# Patient Record
Sex: FEMALE | Race: WHITE | NOT HISPANIC OR LATINO | Employment: PART TIME | URBAN - NONMETROPOLITAN AREA
[De-identification: names, ages, dates, MRNs, and addresses within clinical notes are randomized per-mention and may not be internally consistent; named-entity substitution may affect disease eponyms.]

---

## 2019-02-13 ENCOUNTER — OFFICE VISIT (OUTPATIENT)
Dept: INTERNAL MEDICINE | Facility: CLINIC | Age: 69
End: 2019-02-13

## 2019-02-13 ENCOUNTER — RESULTS ENCOUNTER (OUTPATIENT)
Dept: INTERNAL MEDICINE | Facility: CLINIC | Age: 69
End: 2019-02-13

## 2019-02-13 VITALS
WEIGHT: 184.5 LBS | SYSTOLIC BLOOD PRESSURE: 130 MMHG | HEIGHT: 59 IN | HEART RATE: 83 BPM | OXYGEN SATURATION: 94 % | BODY MASS INDEX: 37.2 KG/M2 | DIASTOLIC BLOOD PRESSURE: 82 MMHG | RESPIRATION RATE: 16 BRPM

## 2019-02-13 DIAGNOSIS — Z00.00 ENCOUNTER FOR PREVENTIVE HEALTH EXAMINATION: ICD-10-CM

## 2019-02-13 DIAGNOSIS — Z12.12 SCREENING FOR COLORECTAL CANCER: ICD-10-CM

## 2019-02-13 DIAGNOSIS — Z12.11 SCREENING FOR COLORECTAL CANCER: ICD-10-CM

## 2019-02-13 DIAGNOSIS — F17.210 CIGARETTE SMOKER: ICD-10-CM

## 2019-02-13 DIAGNOSIS — E55.9 VITAMIN D DEFICIENCY: ICD-10-CM

## 2019-02-13 DIAGNOSIS — Z23 NEED FOR VACCINATION: ICD-10-CM

## 2019-02-13 DIAGNOSIS — I10 ESSENTIAL HYPERTENSION: Primary | ICD-10-CM

## 2019-02-13 DIAGNOSIS — E66.09 CLASS 2 OBESITY DUE TO EXCESS CALORIES WITHOUT SERIOUS COMORBIDITY WITH BODY MASS INDEX (BMI) OF 37.0 TO 37.9 IN ADULT: ICD-10-CM

## 2019-02-13 DIAGNOSIS — Z87.891 PERSONAL HISTORY OF NICOTINE DEPENDENCE: ICD-10-CM

## 2019-02-13 PROBLEM — E66.812 CLASS 2 OBESITY DUE TO EXCESS CALORIES WITHOUT SERIOUS COMORBIDITY WITH BODY MASS INDEX (BMI) OF 37.0 TO 37.9 IN ADULT: Status: ACTIVE | Noted: 2019-02-13

## 2019-02-13 PROBLEM — I34.0 MILD MITRAL REGURGITATION: Status: ACTIVE | Noted: 2019-02-13

## 2019-02-13 PROCEDURE — 90670 PCV13 VACCINE IM: CPT | Performed by: INTERNAL MEDICINE

## 2019-02-13 PROCEDURE — 90471 IMMUNIZATION ADMIN: CPT | Performed by: INTERNAL MEDICINE

## 2019-02-13 PROCEDURE — 99204 OFFICE O/P NEW MOD 45 MIN: CPT | Performed by: INTERNAL MEDICINE

## 2019-02-13 RX ORDER — IRBESARTAN AND HYDROCHLOROTHIAZIDE 300; 12.5 MG/1; MG/1
1 TABLET, FILM COATED ORAL DAILY
COMMUNITY
End: 2019-02-13

## 2019-02-13 RX ORDER — MULTIVIT-MIN/IRON/FOLIC ACID/K 18-600-40
4000 CAPSULE ORAL DAILY
COMMUNITY

## 2019-02-13 RX ORDER — AMLODIPINE BESYLATE 5 MG/1
5 TABLET ORAL DAILY
Qty: 90 TABLET | Refills: 1 | Status: SHIPPED | OUTPATIENT
Start: 2019-02-13 | End: 2019-10-29 | Stop reason: SDUPTHER

## 2019-02-13 RX ORDER — AMLODIPINE BESYLATE 5 MG/1
5 TABLET ORAL DAILY
COMMUNITY
End: 2019-02-13 | Stop reason: SDUPTHER

## 2019-02-13 RX ORDER — OLMESARTAN MEDOXOMIL AND HYDROCHLOROTHIAZIDE 40/12.5 40; 12.5 MG/1; MG/1
1 TABLET ORAL DAILY
Qty: 90 TABLET | Refills: 1 | Status: SHIPPED | OUTPATIENT
Start: 2019-02-13 | End: 2019-10-28 | Stop reason: SDUPTHER

## 2019-02-13 NOTE — PROGRESS NOTES
CC: establish care for hypertension    History:  Hillary Mann is a 68 y.o. female who presents today for evaluation of the above problems.  She notes she has been doing reasonably well, but is seeking to establish care due to difficulty in getting a replacement for her recalled irbesartan from her previous PCP. She notes her BP had done well on current medications without side effects on it, however. She does continue to smoke and is not quite ready to quit at this time. She has history of vitamin d deficiency and continues on supplements. Her weight has been stable, but she plans to work on diet and exercise when her work at the [employer] settles in the next month.     ROS:  Review of Systems   Constitutional: Negative for chills and fever.   HENT: Negative for congestion and sore throat.    Eyes: Negative for visual disturbance.   Respiratory: Negative for cough and shortness of breath.    Cardiovascular: Negative for chest pain, palpitations and leg swelling.   Gastrointestinal: Negative for abdominal pain, constipation and nausea.   Endocrine: Negative for cold intolerance and heat intolerance.   Genitourinary: Negative for difficulty urinating and frequency.   Musculoskeletal: Negative for arthralgias and back pain.   Skin: Negative for rash.   Neurological: Negative for dizziness and headaches.   Psychiatric/Behavioral: Negative for dysphoric mood. The patient is nervous/anxious.        Allergies   Allergen Reactions   • Lisinopril Cough   • Morphine And Related Itching     Past Medical History:   Diagnosis Date   • Allergic rhinitis    • Chronic sinusitis    • Deviated nasal septum    • Eustachian tube dysfunction    • Hypertension    • Hypertrophy of nasal turbinates      Past Surgical History:   Procedure Laterality Date   • BRAIN SURGERY      Aneurysm   • BREAST BIOPSY     • CHOLECYSTECTOMY       Family History   Problem Relation Age of Onset   • Diabetes Mother    • Heart disease Mother    •  "Heart disease Father    • Sinusitis Father         Sinusitis, Chronic    • Hypertension Paternal Grandmother    • Stroke Paternal Grandmother    • Diabetes Paternal Grandfather    • Stroke Paternal Grandfather       reports that she has been smoking.  She has been smoking about 1.00 pack per day. she has never used smokeless tobacco. She reports that she does not drink alcohol or use drugs.      Current Outpatient Medications:   •  amLODIPine (NORVASC) 5 MG tablet, Take 1 tablet by mouth Daily., Disp: 90 tablet, Rfl: 1  •  olmesartan-hydrochlorothiazide (BENICAR HCT) 40-12.5 MG per tablet, Take 1 tablet by mouth Daily., Disp: 90 tablet, Rfl: 1    OBJECTIVE:  /82 (BP Location: Left arm, Patient Position: Sitting, Cuff Size: Adult)   Pulse 83   Resp 16   Ht 149.9 cm (59\")   Wt 83.7 kg (184 lb 8 oz)   SpO2 94%   Breastfeeding? No   BMI 37.26 kg/m²    Physical Exam   Constitutional: She is oriented to person, place, and time. She appears well-developed and well-nourished. No distress.   HENT:   Head: Normocephalic and atraumatic.   Right Ear: External ear normal.   Left Ear: External ear normal.   Nose: Nose normal.   Mouth/Throat: Oropharynx is clear and moist. No oropharyngeal exudate.   Eyes: EOM are normal. No scleral icterus.   Neck: Normal range of motion. No tracheal deviation present.   Cardiovascular: Normal rate, regular rhythm and normal heart sounds.   No murmur heard.  Pulmonary/Chest: Effort normal and breath sounds normal. No accessory muscle usage. No respiratory distress. She has no wheezes.   Abdominal: Soft. Bowel sounds are normal. She exhibits no distension. There is no tenderness.   Musculoskeletal: Normal range of motion.   Neurological: She is alert and oriented to person, place, and time. Coordination and gait normal.   Skin: Skin is warm and dry. No cyanosis. Nails show no clubbing.   No jaundice   Psychiatric: She has a normal mood and affect. Her mood appears not anxious. She " does not exhibit a depressed mood.       Assessment/Plan    Diagnoses and all orders for this visit:    Essential hypertension  -     amLODIPine (NORVASC) 5 MG tablet; Take 1 tablet by mouth Daily.  -     olmesartan-hydrochlorothiazide (BENICAR HCT) 40-12.5 MG per tablet; Take 1 tablet by mouth Daily.  -     Comprehensive Metabolic Panel; Future  -     Lipid Panel; Future  -     CBC (No Diff); Future  Replace irbesartan-HCT with Benicar-HCT due to recalls. Continue amlodipine at current dose. BP well controlled today. Labs as ordered.     Class 2 obesity due to excess calories without serious comorbidity with body mass index (BMI) of 37.0 to 37.9 in adult  Recommended attention to portion control and being careful about the types and timing of meals for the purpose of weight management.    Vitamin D deficiency  -     Vitamin D 25 Hydroxy; Future  Check level to determine need for ongoing supplementation.     Cigarette smoker  Patient was counseled on and understood the many dangers of continuing to use tobacco. Despite this, Ms. Mann states quitting is not an immediate priority at this time. I reminded the patient that if quitting becomes an increased priority to contact us for help with quitting including pharmacologic & nonpharmacologic options or any additional resources.    Encounter for preventive health examination  -     Hepatitis C Antibody; Future    Need for vaccination  -     Pneumococcal Conjugate Vaccine 13-Valent All    Screening for colorectal cancer  -     Cologuard - Stool, Per Rectum; Future    Personal history of nicotine dependence  -     CT Chest Low Dose Wo; Future        An After Visit Summary was printed and given to the patient at discharge.  Return in about 3 months (around 5/13/2019) for Medicare Wellness.         Christopher Jo D.O. 2/13/2019

## 2019-02-26 ENCOUNTER — HOSPITAL ENCOUNTER (OUTPATIENT)
Dept: CT IMAGING | Facility: HOSPITAL | Age: 69
Discharge: HOME | End: 2019-02-26
Admitting: INTERNAL MEDICINE

## 2019-02-26 DIAGNOSIS — Z87.891 PERSONAL HISTORY OF NICOTINE DEPENDENCE: ICD-10-CM

## 2019-02-26 PROCEDURE — G0297 LDCT FOR LUNG CA SCREEN: HCPCS

## 2019-10-26 DIAGNOSIS — I10 ESSENTIAL HYPERTENSION: ICD-10-CM

## 2019-10-26 RX ORDER — OLMESARTAN MEDOXOMIL AND HYDROCHLOROTHIAZIDE 40/12.5 40; 12.5 MG/1; MG/1
1 TABLET ORAL DAILY
Qty: 90 TABLET | Refills: 0 | Status: CANCELLED | OUTPATIENT
Start: 2019-10-26

## 2019-10-28 DIAGNOSIS — I10 ESSENTIAL HYPERTENSION: ICD-10-CM

## 2019-10-28 RX ORDER — OLMESARTAN MEDOXOMIL AND HYDROCHLOROTHIAZIDE 40/12.5 40; 12.5 MG/1; MG/1
1 TABLET ORAL DAILY
Qty: 45 TABLET | Refills: 0 | Status: SHIPPED | OUTPATIENT
Start: 2019-10-28 | End: 2019-12-02 | Stop reason: SDUPTHER

## 2019-10-29 DIAGNOSIS — I10 ESSENTIAL HYPERTENSION: ICD-10-CM

## 2019-10-29 RX ORDER — OLMESARTAN MEDOXOMIL AND HYDROCHLOROTHIAZIDE 40/12.5 40; 12.5 MG/1; MG/1
TABLET ORAL
Qty: 90 TABLET | Refills: 0 | OUTPATIENT
Start: 2019-10-29

## 2019-10-29 RX ORDER — AMLODIPINE BESYLATE 5 MG/1
5 TABLET ORAL DAILY
Qty: 45 TABLET | Refills: 0 | Status: SHIPPED | OUTPATIENT
Start: 2019-10-29 | End: 2019-12-02 | Stop reason: SDUPTHER

## 2019-12-02 ENCOUNTER — OFFICE VISIT (OUTPATIENT)
Dept: INTERNAL MEDICINE | Facility: CLINIC | Age: 69
End: 2019-12-02

## 2019-12-02 ENCOUNTER — LAB (OUTPATIENT)
Dept: LAB | Facility: HOSPITAL | Age: 69
End: 2019-12-02

## 2019-12-02 VITALS
OXYGEN SATURATION: 97 % | WEIGHT: 168 LBS | BODY MASS INDEX: 33.87 KG/M2 | RESPIRATION RATE: 16 BRPM | SYSTOLIC BLOOD PRESSURE: 130 MMHG | HEIGHT: 59 IN | HEART RATE: 84 BPM | DIASTOLIC BLOOD PRESSURE: 86 MMHG

## 2019-12-02 DIAGNOSIS — Z87.891 PERSONAL HISTORY OF NICOTINE DEPENDENCE: ICD-10-CM

## 2019-12-02 DIAGNOSIS — E66.09 CLASS 1 OBESITY DUE TO EXCESS CALORIES WITH SERIOUS COMORBIDITY AND BODY MASS INDEX (BMI) OF 33.0 TO 33.9 IN ADULT: ICD-10-CM

## 2019-12-02 DIAGNOSIS — F17.210 CIGARETTE SMOKER: ICD-10-CM

## 2019-12-02 DIAGNOSIS — Z13.820 ENCOUNTER FOR OSTEOPOROSIS SCREENING IN ASYMPTOMATIC POSTMENOPAUSAL PATIENT: ICD-10-CM

## 2019-12-02 DIAGNOSIS — Z12.12 SCREENING FOR COLORECTAL CANCER: ICD-10-CM

## 2019-12-02 DIAGNOSIS — E55.9 VITAMIN D DEFICIENCY: ICD-10-CM

## 2019-12-02 DIAGNOSIS — Z23 NEED FOR VACCINATION: ICD-10-CM

## 2019-12-02 DIAGNOSIS — Z00.00 ENCOUNTER FOR PREVENTIVE HEALTH EXAMINATION: ICD-10-CM

## 2019-12-02 DIAGNOSIS — Z12.31 ENCOUNTER FOR SCREENING MAMMOGRAM FOR MALIGNANT NEOPLASM OF BREAST: ICD-10-CM

## 2019-12-02 DIAGNOSIS — I10 ESSENTIAL HYPERTENSION: ICD-10-CM

## 2019-12-02 DIAGNOSIS — I10 ESSENTIAL HYPERTENSION: Primary | ICD-10-CM

## 2019-12-02 DIAGNOSIS — Z78.0 ENCOUNTER FOR OSTEOPOROSIS SCREENING IN ASYMPTOMATIC POSTMENOPAUSAL PATIENT: ICD-10-CM

## 2019-12-02 DIAGNOSIS — Z12.11 SCREENING FOR COLORECTAL CANCER: ICD-10-CM

## 2019-12-02 PROBLEM — E66.811 CLASS 1 OBESITY DUE TO EXCESS CALORIES WITHOUT SERIOUS COMORBIDITY WITH BODY MASS INDEX (BMI) OF 33.0 TO 33.9 IN ADULT: Status: ACTIVE | Noted: 2019-02-13

## 2019-12-02 LAB
25(OH)D3 SERPL-MCNC: 26.8 NG/ML (ref 30–100)
ALBUMIN SERPL-MCNC: 4.5 G/DL (ref 3.5–5.2)
ALBUMIN/GLOB SERPL: 1.2 G/DL
ALP SERPL-CCNC: 92 U/L (ref 39–117)
ALT SERPL W P-5'-P-CCNC: 13 U/L (ref 1–33)
ANION GAP SERPL CALCULATED.3IONS-SCNC: 14.4 MMOL/L (ref 5–15)
AST SERPL-CCNC: 15 U/L (ref 1–32)
BILIRUB SERPL-MCNC: 0.3 MG/DL (ref 0.2–1.2)
BUN BLD-MCNC: 11 MG/DL (ref 8–23)
BUN/CREAT SERPL: 13.8 (ref 7–25)
CALCIUM SPEC-SCNC: 9.5 MG/DL (ref 8.6–10.5)
CHLORIDE SERPL-SCNC: 97 MMOL/L (ref 98–107)
CHOLEST SERPL-MCNC: 207 MG/DL (ref 0–200)
CO2 SERPL-SCNC: 26.6 MMOL/L (ref 22–29)
CREAT BLD-MCNC: 0.8 MG/DL (ref 0.57–1)
DEPRECATED RDW RBC AUTO: 40.6 FL (ref 37–54)
ERYTHROCYTE [DISTWIDTH] IN BLOOD BY AUTOMATED COUNT: 13 % (ref 12.3–15.4)
GFR SERPL CREATININE-BSD FRML MDRD: 72 ML/MIN/1.73
GLOBULIN UR ELPH-MCNC: 3.8 GM/DL
GLUCOSE BLD-MCNC: 98 MG/DL (ref 65–99)
HCT VFR BLD AUTO: 41.6 % (ref 34–46.6)
HCV AB SER DONR QL: NORMAL
HDLC SERPL-MCNC: 44 MG/DL (ref 40–60)
HGB BLD-MCNC: 14.5 G/DL (ref 12–15.9)
LDLC SERPL CALC-MCNC: 118 MG/DL (ref 0–100)
LDLC/HDLC SERPL: 2.69 {RATIO}
MCH RBC QN AUTO: 30 PG (ref 26.6–33)
MCHC RBC AUTO-ENTMCNC: 34.9 G/DL (ref 31.5–35.7)
MCV RBC AUTO: 86 FL (ref 79–97)
PLATELET # BLD AUTO: 423 10*3/MM3 (ref 140–450)
PMV BLD AUTO: 10 FL (ref 6–12)
POTASSIUM BLD-SCNC: 3.4 MMOL/L (ref 3.5–5.2)
PROT SERPL-MCNC: 8.3 G/DL (ref 6–8.5)
RBC # BLD AUTO: 4.84 10*6/MM3 (ref 3.77–5.28)
SODIUM BLD-SCNC: 138 MMOL/L (ref 136–145)
TRIGL SERPL-MCNC: 224 MG/DL (ref 0–150)
VLDLC SERPL-MCNC: 44.8 MG/DL (ref 5–40)
WBC NRBC COR # BLD: 9.77 10*3/MM3 (ref 3.4–10.8)

## 2019-12-02 PROCEDURE — 36415 COLL VENOUS BLD VENIPUNCTURE: CPT

## 2019-12-02 PROCEDURE — 80053 COMPREHEN METABOLIC PANEL: CPT | Performed by: INTERNAL MEDICINE

## 2019-12-02 PROCEDURE — 86803 HEPATITIS C AB TEST: CPT | Performed by: INTERNAL MEDICINE

## 2019-12-02 PROCEDURE — 85027 COMPLETE CBC AUTOMATED: CPT | Performed by: INTERNAL MEDICINE

## 2019-12-02 PROCEDURE — 80061 LIPID PANEL: CPT | Performed by: INTERNAL MEDICINE

## 2019-12-02 PROCEDURE — 99214 OFFICE O/P EST MOD 30 MIN: CPT | Performed by: INTERNAL MEDICINE

## 2019-12-02 PROCEDURE — G0438 PPPS, INITIAL VISIT: HCPCS | Performed by: INTERNAL MEDICINE

## 2019-12-02 PROCEDURE — 82306 VITAMIN D 25 HYDROXY: CPT | Performed by: INTERNAL MEDICINE

## 2019-12-02 RX ORDER — AMLODIPINE BESYLATE 5 MG/1
5 TABLET ORAL DAILY
Qty: 90 TABLET | Refills: 1 | Status: SHIPPED | OUTPATIENT
Start: 2019-12-02 | End: 2020-06-02 | Stop reason: SDUPTHER

## 2019-12-02 RX ORDER — OLMESARTAN MEDOXOMIL AND HYDROCHLOROTHIAZIDE 40/12.5 40; 12.5 MG/1; MG/1
1 TABLET ORAL DAILY
Qty: 90 TABLET | Refills: 1 | Status: SHIPPED | OUTPATIENT
Start: 2019-12-02 | End: 2020-06-02 | Stop reason: SDUPTHER

## 2019-12-02 NOTE — PROGRESS NOTES
The ABCs of the Annual Wellness Visit  Initial Medicare Wellness Visit    No chief complaint on file.  See  note    Subjective   History of Present Illness:  Hillary Mann is a 69 y.o. female who presents for an Initial Medicare Wellness Visit.    HEALTH RISK ASSESSMENT    Recent Hospitalizations:  No hospitalization(s) within the last year.    Current Medical Providers:  Patient Care Team:  Christopher Jo DO as PCP - General (Internal Medicine)  Donell Del Rosario MD as PCP - Claims Attributed    Smoking Status:  Social History     Tobacco Use   Smoking Status Current Every Day Smoker   • Packs/day: 0.50   Smokeless Tobacco Never Used   Tobacco Comment    90 pack year       Alcohol Consumption:  Social History     Substance and Sexual Activity   Alcohol Use No       Depression Screen:   PHQ-2/PHQ-9 Depression Screening 12/2/2019   Little interest or pleasure in doing things 0   Feeling down, depressed, or hopeless 0   Total Score 0       Fall Risk Screen:  ANGEL Fall Risk Assessment was completed, and patient is at LOW risk for falls.Assessment completed on:12/2/2019    Health Habits and Functional and Cognitive Screening:  Functional & Cognitive Status 12/2/2019   Do you have difficulty preparing food and eating? No   Do you have difficulty bathing yourself, getting dressed or grooming yourself? No   Do you have difficulty using the toilet? No   Do you have difficulty moving around from place to place? No   Do you have trouble with steps or getting out of a bed or a chair? No   Current Diet Well Balanced Diet   Dental Exam Not up to date   Eye Exam Not up to date   Exercise (times per week) 0 times per week   Current Exercise Activities Include None   Do you need help using the phone?  No   Are you deaf or do you have serious difficulty hearing?  No   Do you need help with transportation? No   Do you need help shopping? No   Do you need help preparing meals?  No   Do you need help with  housework?  No   Do you need help with laundry? No   Do you need help taking your medications? No   Do you need help managing money? No   Do you ever drive or ride in a car without wearing a seat belt? No   Have you felt unusual stress, anger or loneliness in the last month? No   Who do you live with? Spouse   If you need help, do you have trouble finding someone available to you? No   Have you been bothered in the last four weeks by sexual problems? No   Do you have difficulty concentrating, remembering or making decisions? No         Does the patient have evidence of cognitive impairment? No    Asprin use counseling:Does not need ASA (and currently is not on it)    Age-appropriate Screening Schedule:  Refer to the list below for future screening recommendations based on patient's age, sex and/or medical conditions. Orders for these recommended tests are listed in the plan section. The patient has been provided with a written plan.    Health Maintenance   Topic Date Due   • TDAP/TD VACCINES (1 - Tdap) 08/22/1971   • ZOSTER VACCINE (1 of 2) 08/22/2002   • MAMMOGRAM  04/22/2018   • COLONOSCOPY  02/13/2019   • PNEUMOCOCCAL VACCINES (65+ LOW/MEDIUM RISK) (2 of 2 - PPSV23) 02/13/2020   • INFLUENZA VACCINE  Addressed          The following portions of the patient's history were reviewed and updated as appropriate: allergies, current medications, past family history, past medical history, past social history, past surgical history and problem list.    Outpatient Medications Prior to Visit   Medication Sig Dispense Refill   • Cholecalciferol (VITAMIN D) 2000 units capsule Take 12,000 Units by mouth Daily.     • amLODIPine (NORVASC) 5 MG tablet Take 1 tablet by mouth Daily. 45 tablet 0   • olmesartan-hydrochlorothiazide (BENICAR HCT) 40-12.5 MG per tablet Take 1 tablet by mouth Daily. 45 tablet 0     No facility-administered medications prior to visit.        Patient Active Problem List   Diagnosis   • Essential  "hypertension   • Class 1 obesity due to excess calories with serious comorbidity and body mass index (BMI) of 33.0 to 33.9 in adult   • Vitamin D deficiency   • Cigarette smoker   • Mild mitral regurgitation       Advanced Care Planning:  Patient does not have an advance directive - information provided to the patient today    Review of Systems See  note    Compared to one year ago, the patient feels her physical health is better.  Compared to one year ago, the patient feels her mental health is the same.    Reviewed chart for potential of high risk medication in the elderly: yes  Reviewed chart for potential of harmful drug interactions in the elderly:yes    Objective         Vitals:    12/02/19 1001   BP: 130/86   BP Location: Right arm   Patient Position: Sitting   Cuff Size: Adult   Pulse: 84   Resp: 16   SpO2: 97%   Weight: 76.2 kg (168 lb)   Height: 149.9 cm (59\")       Body mass index is 33.93 kg/m².  Discussed the patient's BMI with her. The BMI is above average; BMI management plan is completed.    Physical Exam See  note    Lab Results   Component Value Date    TRIG 224 (H) 12/02/2019    HDL 44 12/02/2019     (H) 12/02/2019    VLDL 44.8 (H) 12/02/2019        Assessment/Plan   Medicare Risks and Personalized Health Plan  CMS Preventative Services Quick Reference  Advance Directive Discussion  Breast Cancer/Mammogram Screening  Cardiovascular risk  Depression/Dysphoria  Fall Risk  Immunizations Discussed/Encouraged (specific immunizations; adacel Tdap, Influenza and Shingrix )  Inactivity/Sedentary  Lung Cancer Risk  Obesity/Overweight   Osteoprorosis Risk    The above risks/problems have been discussed with the patient.  Pertinent information has been shared with the patient in the After Visit Summary.  Follow up plans and orders are seen below in the Assessment/Plan Section.    Diagnoses and all orders for this visit:    1. Essential hypertension (Primary)  -     amLODIPine " (NORVASC) 5 MG tablet; Take 1 tablet by mouth Daily.  Dispense: 90 tablet; Refill: 1  -     olmesartan-hydrochlorothiazide (BENICAR HCT) 40-12.5 MG per tablet; Take 1 tablet by mouth Daily.  Dispense: 90 tablet; Refill: 1    2. Class 1 obesity due to excess calories with serious comorbidity and body mass index (BMI) of 33.0 to 33.9 in adult    3. Cigarette smoker    4. Personal history of nicotine dependence  -     CT Chest Without Contrast; Future    5. Encounter for osteoporosis screening in asymptomatic postmenopausal patient  -     DEXA Bone Density Axial; Future    6. Screening for colorectal cancer  -     Ambulatory Referral For Screening Colonoscopy    7. Encounter for screening mammogram for malignant neoplasm of breast  -     Mammo Screening Digital Tomosynthesis Bilateral With CAD; Future    8. Need for vaccination  -     Fluad Tri 65yr ([id number])      Follow Up:  Return in about 6 months (around 6/2/2020) for Recheck.     An After Visit Summary and PPPS were given to the patient.

## 2019-12-02 NOTE — PATIENT INSTRUCTIONS
Medicare Wellness  Personal Prevention Plan of Service     Date of Office Visit:  2019  Encounter Provider:  Christopher Jo DO  Place of Service:  [facility]  Patient Name: Hillary Mann  :  1950    As part of the Medicare Wellness portion of your visit today, we are providing you with this personalized preventive plan of services (PPPS). This plan is based upon recommendations of the [place] Preventive Services Task Force (USPSTF) and the Advisory Committee on Immunization Practices (ACIP).    This lists the preventive care services that should be considered, and provides dates of when you are due. Items listed as completed are up-to-date and do not require any further intervention.    Health Maintenance   Topic Date Due   • TDAP/TD VACCINES (1 - Tdap) 1971   • ZOSTER VACCINE (1 of 2) 2002   • MAMMOGRAM  2018   • HEPATITIS C SCREENING  2019   • MEDICARE ANNUAL WELLNESS  2019   • COLONOSCOPY  2019   • INFLUENZA VACCINE  2019   • PNEUMOCOCCAL VACCINES (65+ LOW/MEDIUM RISK) (2 of 2 - PPSV23) 2020   • LUNG CANCER SCREENING  2020       Orders Placed This Encounter   Procedures   • CT Chest Without Contrast     Standing Status:   Future     Standing Expiration Date:   2020   • Mammo Screening Digital Tomosynthesis Bilateral With CAD     Standing Status:   Future     Standing Expiration Date:   2020     Order Specific Question:   Reason for Exam:     Answer:   screening   • DEXA Bone Density Axial     Standing Status:   Future     Standing Expiration Date:   2020     Order Specific Question:   Reason for Exam:     Answer:   screening   • Fluad Tri 65yr ([id number])   • Ambulatory Referral For Screening Colonoscopy     Referral Priority:   Routine     Referral Type:   Diagnostic Medical     Referral Reason:   Specialty Services Required     Referred to Provider:   Dread  Thiago TEMPLETON, DO     Number of Visits Requested:   1       Return in about 6 months (around 6/2/2020) for Recheck.

## 2019-12-24 ENCOUNTER — HOSPITAL ENCOUNTER (OUTPATIENT)
Dept: BONE DENSITY | Facility: HOSPITAL | Age: 69
Discharge: HOME | End: 2019-12-24

## 2019-12-24 ENCOUNTER — HOSPITAL ENCOUNTER (OUTPATIENT)
Dept: MAMMOGRAPHY | Facility: HOSPITAL | Age: 69
Discharge: HOME | End: 2019-12-24
Admitting: INTERNAL MEDICINE

## 2019-12-24 DIAGNOSIS — Z12.31 ENCOUNTER FOR SCREENING MAMMOGRAM FOR MALIGNANT NEOPLASM OF BREAST: ICD-10-CM

## 2019-12-24 DIAGNOSIS — Z13.820 ENCOUNTER FOR OSTEOPOROSIS SCREENING IN ASYMPTOMATIC POSTMENOPAUSAL PATIENT: ICD-10-CM

## 2019-12-24 DIAGNOSIS — Z78.0 ENCOUNTER FOR OSTEOPOROSIS SCREENING IN ASYMPTOMATIC POSTMENOPAUSAL PATIENT: ICD-10-CM

## 2019-12-24 PROCEDURE — 77080 DXA BONE DENSITY AXIAL: CPT

## 2019-12-24 PROCEDURE — 77067 SCR MAMMO BI INCL CAD: CPT

## 2019-12-24 PROCEDURE — 77063 BREAST TOMOSYNTHESIS BI: CPT

## 2019-12-26 PROBLEM — M85.852 OSTEOPENIA OF LEFT HIP: Status: ACTIVE | Noted: 2019-12-26

## 2019-12-26 RX ORDER — PHENOL 1.4 %
600 AEROSOL, SPRAY (ML) MUCOUS MEMBRANE DAILY
COMMUNITY
End: 2020-12-02

## 2020-02-28 ENCOUNTER — APPOINTMENT (OUTPATIENT)
Dept: CT IMAGING | Facility: HOSPITAL | Age: 70
End: 2020-02-28

## 2020-03-11 ENCOUNTER — HOSPITAL ENCOUNTER (OUTPATIENT)
Dept: CT IMAGING | Facility: HOSPITAL | Age: 70
Discharge: HOME | End: 2020-03-11
Admitting: INTERNAL MEDICINE

## 2020-03-11 DIAGNOSIS — Z87.891 PERSONAL HISTORY OF NICOTINE DEPENDENCE: ICD-10-CM

## 2020-03-11 PROCEDURE — G0297 LDCT FOR LUNG CA SCREEN: HCPCS

## 2020-06-02 ENCOUNTER — OFFICE VISIT (OUTPATIENT)
Dept: INTERNAL MEDICINE | Facility: CLINIC | Age: 70
End: 2020-06-02

## 2020-06-02 VITALS
RESPIRATION RATE: 16 BRPM | WEIGHT: 175.5 LBS | SYSTOLIC BLOOD PRESSURE: 118 MMHG | BODY MASS INDEX: 35.38 KG/M2 | HEIGHT: 59 IN | OXYGEN SATURATION: 95 % | DIASTOLIC BLOOD PRESSURE: 84 MMHG | HEART RATE: 71 BPM | TEMPERATURE: 97.6 F

## 2020-06-02 DIAGNOSIS — I10 ESSENTIAL HYPERTENSION: ICD-10-CM

## 2020-06-02 DIAGNOSIS — E66.01 CLASS 2 SEVERE OBESITY DUE TO EXCESS CALORIES WITH SERIOUS COMORBIDITY AND BODY MASS INDEX (BMI) OF 35.0 TO 35.9 IN ADULT (HCC): ICD-10-CM

## 2020-06-02 DIAGNOSIS — F17.210 CIGARETTE SMOKER: ICD-10-CM

## 2020-06-02 DIAGNOSIS — Z12.12 SCREENING FOR COLORECTAL CANCER: ICD-10-CM

## 2020-06-02 DIAGNOSIS — Z12.11 SCREENING FOR COLORECTAL CANCER: ICD-10-CM

## 2020-06-02 DIAGNOSIS — Z86.73 HISTORY OF TIA (TRANSIENT ISCHEMIC ATTACK): Primary | ICD-10-CM

## 2020-06-02 DIAGNOSIS — N39.41 URGE INCONTINENCE OF URINE: ICD-10-CM

## 2020-06-02 DIAGNOSIS — Z23 NEED FOR VACCINATION: ICD-10-CM

## 2020-06-02 DIAGNOSIS — E55.9 VITAMIN D DEFICIENCY: ICD-10-CM

## 2020-06-02 PROCEDURE — G0009 ADMIN PNEUMOCOCCAL VACCINE: HCPCS | Performed by: INTERNAL MEDICINE

## 2020-06-02 PROCEDURE — 99214 OFFICE O/P EST MOD 30 MIN: CPT | Performed by: INTERNAL MEDICINE

## 2020-06-02 PROCEDURE — 90732 PPSV23 VACC 2 YRS+ SUBQ/IM: CPT | Performed by: INTERNAL MEDICINE

## 2020-06-02 RX ORDER — OLMESARTAN MEDOXOMIL AND HYDROCHLOROTHIAZIDE 40/12.5 40; 12.5 MG/1; MG/1
1 TABLET ORAL DAILY
Qty: 90 TABLET | Refills: 3 | Status: SHIPPED | OUTPATIENT
Start: 2020-06-02 | End: 2020-12-02

## 2020-06-02 RX ORDER — ASPIRIN 81 MG/1
81 TABLET, CHEWABLE ORAL DAILY
COMMUNITY

## 2020-06-02 RX ORDER — AMLODIPINE BESYLATE 5 MG/1
5 TABLET ORAL DAILY
Qty: 90 TABLET | Refills: 3 | Status: SHIPPED | OUTPATIENT
Start: 2020-06-02 | End: 2020-12-02

## 2020-06-02 RX ORDER — ATORVASTATIN CALCIUM 20 MG/1
20 TABLET, FILM COATED ORAL DAILY
Qty: 90 TABLET | Refills: 3 | Status: SHIPPED | OUTPATIENT
Start: 2020-06-02 | End: 2020-12-02

## 2020-06-02 NOTE — PATIENT INSTRUCTIONS
Kegel Exercises    Kegel exercises can help strengthen your pelvic floor muscles. The pelvic floor is a group of muscles that support your rectum, small intestine, and bladder. In females, pelvic floor muscles also help support the womb (uterus). These muscles help you control the flow of urine and stool.  Kegel exercises are painless and simple, and they do not require any equipment. Your provider may suggest Kegel exercises to:  · Improve bladder and bowel control.  · Improve sexual response.  · Improve weak pelvic floor muscles after surgery to remove the uterus (hysterectomy) or pregnancy (females).  · Improve weak pelvic floor muscles after prostate gland removal or surgery (males).  Kegel exercises involve squeezing your pelvic floor muscles, which are the same muscles you squeeze when you try to stop the flow of urine or keep from passing gas. The exercises can be done while sitting, standing, or lying down, but it is best to vary your position.  Exercises  How to do Kegel exercises:  1. Squeeze your pelvic floor muscles tight. You should feel a tight lift in your rectal area. If you are a female, you should also feel a tightness in your vaginal area. Keep your stomach, buttocks, and legs relaxed.  2. Hold the muscles tight for up to 10 seconds.  3. Breathe normally.  4. Relax your muscles.  5. Repeat as told by your health care provider.  Repeat this exercise daily as told by your health care provider. Continue to do this exercise for at least 4-6 weeks, or for as long as told by your health care provider.  You may be referred to a physical therapist who can help you learn more about how to do Kegel exercises.  Depending on your condition, your health care provider may recommend:  · Varying how long you squeeze your muscles.  · Doing several sets of exercises every day.  · Doing exercises for several weeks.  · Making Kegel exercises a part of your regular exercise routine.  This information is not intended  to replace advice given to you by your health care provider. Make sure you discuss any questions you have with your health care provider.  Document Released: 12/04/2013 Document Revised: 08/07/2019 Document Reviewed: 08/07/2019  Elsevier Patient Education © 2020 [employer]

## 2020-06-02 NOTE — PROGRESS NOTES
"CC: f/u hypertension    History:  Hillary Mann is a 69 y.o. female   She reports she has been doing reasonably well, but did have an episode of dysarthria about 2 months ago, though she went to the hospital in [place] and had work-up that was largely negative.  She was started on a baby aspirin and has had no further episodes.    Blood pressure has done well on current medication without side effects.    She does continue to have difficulty with urge incontinence, though she has thought about scheduling urination.  She does not feel it is bad enough to take medication for at this time.       ROS:  Review of Systems   Constitutional: Negative for chills and fever.   Respiratory: Negative for cough and shortness of breath.    Cardiovascular: Negative for chest pain and palpitations.   Gastrointestinal: Negative for abdominal pain and constipation.   Genitourinary: Positive for urgency. Negative for difficulty urinating and dysuria.        reports that she has been smoking. She has been smoking about 0.50 packs per day. She has never used smokeless tobacco. She reports that she does not drink alcohol or use drugs.      Current Outpatient Medications:   •  amLODIPine (NORVASC) 5 MG tablet, Take 1 tablet by mouth Daily., Disp: 90 tablet, Rfl: 1  •  calcium carbonate (OS-DOUG) 600 MG tablet, Take 600 mg by mouth Daily., Disp: , Rfl:   •  Cholecalciferol (VITAMIN D) 2000 units capsule, Take 12,000 Units by mouth Daily., Disp: , Rfl:   •  olmesartan-hydrochlorothiazide (BENICAR HCT) 40-12.5 MG per tablet, Take 1 tablet by mouth Daily., Disp: 90 tablet, Rfl: 1    OBJECTIVE:  /84 (BP Location: Left arm, Patient Position: Sitting, Cuff Size: Adult)   Pulse 71   Temp 97.6 °F (36.4 °C)   Resp 16   Ht 149.9 cm (59\")   Wt 79.6 kg (175 lb 8 oz)   SpO2 95%   Breastfeeding No   BMI 35.45 kg/m²    Physical Exam   Constitutional: She is oriented to person, place, and time. She appears well-nourished. No distress. "   Cardiovascular: Normal rate and normal heart sounds. A regularly irregular rhythm present.   No murmur heard.  Pulmonary/Chest: Effort normal and breath sounds normal. No respiratory distress. She has no wheezes.   Neurological: She is alert and oriented to person, place, and time.   Psychiatric: She has a normal mood and affect.       Assessment/Plan     Diagnoses and all orders for this visit:    History of TIA (transient ischemic attack)  -     atorvastatin (LIPITOR) 20 MG tablet; Take 1 tablet by mouth Daily.  -     Lipid Panel; Future  Continue ASA & add statin for secondary prevention.     Essential hypertension  -     olmesartan-hydrochlorothiazide (BENICAR HCT) 40-12.5 MG per tablet; Take 1 tablet by mouth Daily.  -     amLODIPine (NORVASC) 5 MG tablet; Take 1 tablet by mouth Daily.  -     Comprehensive Metabolic Panel; Future  -     Lipid Panel; Future  Well controlled, BP goal for age is <140/90 per JNC 8 guidelines and continue current medications    Cigarette smoker  Patient was counseled on and understood the many dangers of continuing to use tobacco. Despite this, Ms. Mann states quitting is not an immediate priority at this time. I reminded the patient that if quitting becomes an increased priority to contact us for help with quitting including pharmacologic & nonpharmacologic options or any additional resources.    Class 2 severe obesity due to excess calories with serious comorbidity and body mass index (BMI) of 35.0 to 35.9 in adult (CMS/HCC)  Recommended attention to portion control and being careful about the types and timing of meals for the purpose of weight management.    Vitamin D deficiency  -     Vitamin D 25 Hydroxy; Future    Urge incontinence of urine  Discussed Kegels, timed voiding, and medication. She prefers to try conservative measures at this time. Marilynn with AVS.     Screening for colorectal cancer  -     Ambulatory Referral For Screening Colonoscopy    Need for  vaccination  -     Pneumococcal Polysaccharide Vaccine 23-Valent Greater Than or Equal To 3yo Subcutaneous / IM        An After Visit Summary was printed and given to the patient at discharge.  Return in about 6 months (around 12/2/2020) for Medicare Wellness.          Christopher Jo D.O. 6/2/2020   Electronically signed.

## 2020-06-17 ENCOUNTER — TELEPHONE (OUTPATIENT)
Dept: INTERNAL MEDICINE | Facility: CLINIC | Age: 70
End: 2020-06-17

## 2020-06-17 NOTE — PROGRESS NOTES
Chief Complaint   Patient presents with   • Colon Cancer Screening     last colon 07/2008       PCP: Christopher Jo DO  REFER: Christopher Jo,     Subjective     HPI    Hillary Mann is a 69 y.o. female who presents to office for preventative maintenance.  There is not  a personal history of colon polyps.  There is not a history of colon cancer.  She does not have complaints of nausea/vomiting, change in bowels, weight loss, no BRBPR, no melena.  There is not a family history of colon cancer.  There is not a family history of colon polyps.  She last colonoscopy-2008 .  Bowels do move on regular basis.    CScope (Dr Canada) 2008-normal   Endoscopy (Dr Canada) 2007-normal         Past Medical History:   Diagnosis Date   • Allergic rhinitis    • Chronic sinusitis    • Deviated nasal septum    • Eustachian tube dysfunction    • Hypertension    • Hypertrophy of nasal turbinates      Past Surgical History:   Procedure Laterality Date   • BRAIN SURGERY      Aneurysm   • BREAST EXCISIONAL BIOPSY Left 1975    benign   • BREAST EXCISIONAL BIOPSY Left 1988    benign   • CHOLECYSTECTOMY     • COLONOSCOPY  07/22/2008    normal   • ENDOSCOPY  11/02/2007    normal     Outpatient Medications Marked as Taking for the 6/18/20 encounter (Office Visit) with Marvel Andre APRN   Medication Sig Dispense Refill   • amLODIPine (NORVASC) 5 MG tablet Take 1 tablet by mouth Daily. 90 tablet 3   • aspirin 81 MG chewable tablet Chew 81 mg Daily.     • calcium carbonate (OS-DOUG) 600 MG tablet Take 600 mg by mouth Daily.     • Cholecalciferol (VITAMIN D) 2000 units capsule Take 12,000 Units by mouth Daily.     • olmesartan-hydrochlorothiazide (BENICAR HCT) 40-12.5 MG per tablet Take 1 tablet by mouth Daily. 90 tablet 3     Allergies   Allergen Reactions   • Lisinopril Cough   • Morphine And Related Itching     Social History     Socioeconomic History   • Marital status:      Spouse name: Not on file   • Number  of children: Not on file   • Years of education: Not on file   • Highest education level: Not on file   Tobacco Use   • Smoking status: Current Every Day Smoker     Packs/day: 0.50   • Smokeless tobacco: Never Used   • Tobacco comment: 90 pack year   Substance and Sexual Activity   • Alcohol use: No   • Drug use: No   • Sexual activity: Yes     Partners: Male     Family History   Problem Relation Age of Onset   • Diabetes Mother    • Heart disease Mother    • Heart disease Father    • Sinusitis Father         Sinusitis, Chronic    • Hypertension Paternal Grandmother    • Stroke Paternal Grandmother    • Diabetes Paternal Grandfather    • Stroke Paternal Grandfather    • Breast cancer Neg Hx    • Colon cancer Neg Hx      Review of Systems   Constitutional: Negative for fatigue, fever and unexpected weight change.   HENT: Negative for hearing loss, sore throat and voice change.    Eyes: Negative for visual disturbance.   Respiratory: Negative for cough, shortness of breath and wheezing.    Cardiovascular: Negative for chest pain and palpitations.   Gastrointestinal: Negative for abdominal pain, blood in stool and vomiting.   Endocrine: Negative for polydipsia and polyuria.   Genitourinary: Negative for difficulty urinating, dysuria, hematuria and urgency.   Musculoskeletal: Negative for joint swelling and myalgias.   Skin: Negative for color change, rash and wound.   Neurological: Negative for dizziness, tremors, seizures and syncope.   Hematological: Does not bruise/bleed easily.   Psychiatric/Behavioral: Negative for agitation and confusion. The patient is not nervous/anxious.      Objective   Vitals:    06/18/20 1042   BP: 132/78   Pulse: 79   Temp: 97 °F (36.1 °C)   SpO2: 98%     Physical Exam   Constitutional: She is oriented to person, place, and time. She appears well-developed and well-nourished. She is cooperative.   HENT:   Head: Normocephalic and atraumatic.   Eyes: Pupils are equal, round, and reactive to  light. Conjunctivae are normal. No scleral icterus.   Neck: Normal range of motion. Neck supple. No JVD present. No thyroid mass and no thyromegaly present.   Cardiovascular: Normal rate, regular rhythm and normal heart sounds. Exam reveals no gallop and no friction rub.   No murmur heard.  Pulmonary/Chest: Effort normal and breath sounds normal. No accessory muscle usage. No respiratory distress. She has no wheezes. She has no rales.   Abdominal: Soft. Normal appearance and bowel sounds are normal. She exhibits no distension, no ascites and no mass. There is no hepatosplenomegaly. There is no tenderness. There is no rebound and no guarding.   Musculoskeletal: Normal range of motion. She exhibits no edema or tenderness.     Vascular Status -  Her right foot exhibits normal foot vasculature  and no edema. Her left foot exhibits normal foot vasculature  and no edema.  Lymphadenopathy:     She has no cervical adenopathy.   Neurological: She is alert and oriented to person, place, and time. She has normal strength. Gait normal.   Skin: Skin is warm, dry and intact. No rash noted.     Imaging Results (Most Recent)     None        Body mass index is 35.95 kg/m².    Assessment/Plan   Hillary was seen today for colon cancer screening.    Diagnoses and all orders for this visit:    Encounter for screening for malignant neoplasm of colon  -     Case Request; Standing  -     Case Request    Other orders  -     sodium-potassium-magnesium sulfates (Suprep Bowel Prep Kit) 17.5-3.13-1.6 GM/177ML solution oral solution; Take as directed      COLONOSCOPY WITH ANESTHESIA (N/A)      Patient is to continue all blood pressure and cardiac medications prior to procedure and has been advised to take medications morning of procedure   Pt verbalized understanding     Advised pt to stop ASA, use of NSAIDs, Fish Oil, and MV 5 days prior to procedure, per Dr Canada protocol.  Tylenol based products are ok to take.  Pt verbalized  understanding.    All risks, benefits, alternatives, and indications of colonoscopy procedure have been discussed with the patient. Risks to include perforation of the colon requiring possible surgery or colostomy, risk of bleeding from biopsies or removal of colon tissue, possibility of missing a colon polyp or cancer, or adverse drug reaction.  Benefits to include the diagnosis and management of disease of the colon and rectum. Alternatives to include barium enema, radiographic evaluation, lab testing or no intervention. She verbalizes understanding and agrees.     Patient's Body mass index is 35.95 kg/m². BMI is above normal parameters. Recommendations include: no follow up.      Precautions are currently being put in place due to COVID-19.  I have explained to Hillary Mann they will be required to undergo COVID testing prior to their procedure.  Hillary Mann verbalized understanding and was willing to proceed.     Marvel Andre, APRN  06/18/20        There are no Patient Instructions on file for this visit.

## 2020-06-17 NOTE — TELEPHONE ENCOUNTER
Patient was informed.       Patient called she states that she is having leg cramps, she did start the Lipitor they are getting worse. Was wondering what to do?     Per Dr. Jo, he states that she can stop the medication & see how the cramps are, if they continue to call the office.

## 2020-06-18 ENCOUNTER — OFFICE VISIT (OUTPATIENT)
Dept: GASTROENTEROLOGY | Facility: CLINIC | Age: 70
End: 2020-06-18

## 2020-06-18 ENCOUNTER — TRANSCRIBE ORDERS (OUTPATIENT)
Dept: ADMINISTRATIVE | Facility: HOSPITAL | Age: 70
End: 2020-06-18

## 2020-06-18 VITALS
SYSTOLIC BLOOD PRESSURE: 132 MMHG | WEIGHT: 178 LBS | BODY MASS INDEX: 35.88 KG/M2 | TEMPERATURE: 97 F | DIASTOLIC BLOOD PRESSURE: 78 MMHG | HEIGHT: 59 IN | OXYGEN SATURATION: 98 % | HEART RATE: 79 BPM

## 2020-06-18 DIAGNOSIS — Z01.818 PRE-OP TESTING: Primary | ICD-10-CM

## 2020-06-18 DIAGNOSIS — Z12.11 ENCOUNTER FOR SCREENING FOR MALIGNANT NEOPLASM OF COLON: Primary | ICD-10-CM

## 2020-06-18 PROCEDURE — S0260 H&P FOR SURGERY: HCPCS | Performed by: NURSE PRACTITIONER

## 2020-06-18 RX ORDER — SODIUM, POTASSIUM,MAG SULFATES 17.5-3.13G
SOLUTION, RECONSTITUTED, ORAL ORAL
Qty: 1 BOTTLE | Refills: 0 | Status: SHIPPED | OUTPATIENT
Start: 2020-06-18 | End: 2020-12-02

## 2020-06-20 ENCOUNTER — LAB (OUTPATIENT)
Dept: LAB | Facility: HOSPITAL | Age: 70
End: 2020-06-20

## 2020-06-20 PROCEDURE — U0003 INFECTIOUS AGENT DETECTION BY NUCLEIC ACID (DNA OR RNA); SEVERE ACUTE RESPIRATORY SYNDROME CORONAVIRUS 2 (SARS-COV-2) (CORONAVIRUS DISEASE [COVID-19]), AMPLIFIED PROBE TECHNIQUE, MAKING USE OF HIGH THROUGHPUT TECHNOLOGIES AS DESCRIBED BY CMS-2020-01-R: HCPCS | Performed by: INTERNAL MEDICINE

## 2020-06-21 LAB
COVID LABCORP PRIORITY: NORMAL
SARS-COV-2 RNA RESP QL NAA+PROBE: NOT DETECTED

## 2020-06-23 ENCOUNTER — ANESTHESIA EVENT (OUTPATIENT)
Dept: GASTROENTEROLOGY | Facility: HOSPITAL | Age: 70
End: 2020-06-23

## 2020-06-23 ENCOUNTER — HOSPITAL ENCOUNTER (OUTPATIENT)
Facility: HOSPITAL | Age: 70
Setting detail: HOSPITAL OUTPATIENT SURGERY
Discharge: HOME | End: 2020-06-23
Attending: INTERNAL MEDICINE | Admitting: INTERNAL MEDICINE

## 2020-06-23 ENCOUNTER — ANESTHESIA (OUTPATIENT)
Dept: GASTROENTEROLOGY | Facility: HOSPITAL | Age: 70
End: 2020-06-23

## 2020-06-23 VITALS
TEMPERATURE: 97.1 F | HEIGHT: 59 IN | BODY MASS INDEX: 34.27 KG/M2 | WEIGHT: 170 LBS | RESPIRATION RATE: 20 BRPM | DIASTOLIC BLOOD PRESSURE: 64 MMHG | HEART RATE: 80 BPM | OXYGEN SATURATION: 94 % | SYSTOLIC BLOOD PRESSURE: 118 MMHG

## 2020-06-23 DIAGNOSIS — Z12.11 ENCOUNTER FOR SCREENING FOR MALIGNANT NEOPLASM OF COLON: ICD-10-CM

## 2020-06-23 PROCEDURE — 88305 TISSUE EXAM BY PATHOLOGIST: CPT | Performed by: INTERNAL MEDICINE

## 2020-06-23 PROCEDURE — 25010000002 PROPOFOL 10 MG/ML EMULSION: Performed by: NURSE ANESTHETIST, CERTIFIED REGISTERED

## 2020-06-23 PROCEDURE — 45385 COLONOSCOPY W/LESION REMOVAL: CPT | Performed by: INTERNAL MEDICINE

## 2020-06-23 RX ORDER — SODIUM CHLORIDE 0.9 % (FLUSH) 0.9 %
10 SYRINGE (ML) INJECTION AS NEEDED
Status: DISCONTINUED | OUTPATIENT
Start: 2020-06-23 | End: 2020-06-23 | Stop reason: HOSPADM

## 2020-06-23 RX ORDER — SODIUM CHLORIDE 0.9 % (FLUSH) 0.9 %
10 SYRINGE (ML) INJECTION EVERY 12 HOURS SCHEDULED
Status: DISCONTINUED | OUTPATIENT
Start: 2020-06-23 | End: 2020-06-23 | Stop reason: HOSPADM

## 2020-06-23 RX ORDER — PROPOFOL 10 MG/ML
VIAL (ML) INTRAVENOUS AS NEEDED
Status: DISCONTINUED | OUTPATIENT
Start: 2020-06-23 | End: 2020-06-23 | Stop reason: SURG

## 2020-06-23 RX ORDER — SODIUM CHLORIDE 9 MG/ML
100 INJECTION, SOLUTION INTRAVENOUS CONTINUOUS
Status: DISCONTINUED | OUTPATIENT
Start: 2020-06-23 | End: 2020-06-23 | Stop reason: HOSPADM

## 2020-06-23 RX ADMIN — PROPOFOL 270 MG: 10 INJECTION, EMULSION INTRAVENOUS at 12:24

## 2020-06-23 RX ADMIN — LIDOCAINE HYDROCHLORIDE 60 MG: 20 INJECTION, SOLUTION INTRAVENOUS at 12:24

## 2020-06-23 RX ADMIN — SODIUM CHLORIDE 100 ML/HR: 9 INJECTION, SOLUTION INTRAVENOUS at 11:57

## 2020-06-23 NOTE — ANESTHESIA POSTPROCEDURE EVALUATION
"Patient: Hillary Mann    Procedure Summary     Date:  06/23/20 Room / Location:  [facility] ENDOSCOPY 5 /  [facility]    Anesthesia Start:  1222 Anesthesia Stop:  1238    Procedure:  COLONOSCOPY WITH ANESTHESIA (N/A ) Diagnosis:       Encounter for screening for malignant neoplasm of colon      (Encounter for screening for malignant neoplasm of colon [Z12.11])    Surgeon:  Thiago Canada DO Provider:  Martina Heath CRNA    Anesthesia Type:  MAC ASA Status:  2          Anesthesia Type: MAC    Vitals  Vitals Value Taken Time   BP     Temp     Pulse 87 6/23/2020 12:38 PM   Resp     SpO2 93 % 6/23/2020 12:38 PM   Vitals shown include unvalidated device data.        Post Anesthesia Care and Evaluation    Patient location during evaluation: PACU  Patient participation: complete - patient participated  Level of consciousness: awake and alert  Pain management: adequate  Airway patency: patent  Anesthetic complications: No anesthetic complications    Cardiovascular status: acceptable  Respiratory status: acceptable  Hydration status: acceptable    Comments: Blood pressure 164/78, pulse 89, temperature 97.1 °F (36.2 °C), temperature source Temporal, resp. rate 18, height 149.9 cm (59\"), weight 77.1 kg (170 lb), SpO2 96 %, not currently breastfeeding.    Pt discharged from PACU based on rome score >8      "

## 2020-06-23 NOTE — ANESTHESIA PREPROCEDURE EVALUATION
Anesthesia Evaluation     no history of anesthetic complications:  NPO Solid Status: > 8 hours  NPO Liquid Status: > 2 hours           Airway   Mallampati: III  TM distance: >3 FB  Neck ROM: full  Dental - normal exam         Pulmonary - normal exam    breath sounds clear to auscultation  (+) a smoker (1 ppd) Current Smoked day of surgery,   (-) asthma, recent URI, sleep apnea  Cardiovascular - normal exam  Exercise tolerance: good (4-7 METS)    Rhythm: regular  Rate: normal    (+) hypertension, valvular problems/murmurs MR,   (-) pacemaker, past MI, angina, cardiac stents, CABG      Neuro/Psych  (-) seizures, TIA, CVA    ROS Comment: Aneurysm clipping 2000  GI/Hepatic/Renal/Endo    (+) obesity,     (-) GERD, liver disease, no renal disease, diabetes, no thyroid disorder    Musculoskeletal     Abdominal    Substance History      OB/GYN          Other                        Anesthesia Plan    ASA 2     MAC     intravenous induction     Anesthetic plan, all risks, benefits, and alternatives have been provided, discussed and informed consent has been obtained with: patient.

## 2020-06-24 LAB
LAB AP CASE REPORT: NORMAL
PATH REPORT.FINAL DX SPEC: NORMAL
PATH REPORT.GROSS SPEC: NORMAL

## 2020-06-26 ENCOUNTER — TELEPHONE (OUTPATIENT)
Dept: GASTROENTEROLOGY | Facility: CLINIC | Age: 70
End: 2020-06-26

## 2020-09-03 DIAGNOSIS — I10 ESSENTIAL HYPERTENSION: ICD-10-CM

## 2020-09-03 RX ORDER — AMLODIPINE BESYLATE 5 MG/1
TABLET ORAL
Qty: 90 TABLET | Refills: 3 | OUTPATIENT
Start: 2020-09-03

## 2020-11-13 ENCOUNTER — TRANSCRIBE ORDERS (OUTPATIENT)
Dept: ADMINISTRATIVE | Facility: HOSPITAL | Age: 70
End: 2020-11-13

## 2020-11-13 DIAGNOSIS — Z12.31 ENCOUNTER FOR SCREENING MAMMOGRAM FOR MALIGNANT NEOPLASM OF BREAST: Primary | ICD-10-CM

## 2020-11-27 ENCOUNTER — LAB (OUTPATIENT)
Dept: LAB | Facility: HOSPITAL | Age: 70
End: 2020-11-27

## 2020-11-27 DIAGNOSIS — I10 ESSENTIAL HYPERTENSION: ICD-10-CM

## 2020-11-27 DIAGNOSIS — E55.9 VITAMIN D DEFICIENCY: ICD-10-CM

## 2020-11-27 LAB — 25(OH)D3 SERPL-MCNC: 67.8 NG/ML (ref 30–100)

## 2020-11-27 PROCEDURE — 80061 LIPID PANEL: CPT

## 2020-11-27 PROCEDURE — 80053 COMPREHEN METABOLIC PANEL: CPT

## 2020-11-27 PROCEDURE — 82306 VITAMIN D 25 HYDROXY: CPT

## 2020-11-28 DIAGNOSIS — I10 ESSENTIAL HYPERTENSION: Primary | ICD-10-CM

## 2020-11-30 LAB
ALBUMIN SERPL-MCNC: 4.4 G/DL (ref 3.5–5.2)
ALBUMIN/GLOB SERPL: 1.6 G/DL
ALP SERPL-CCNC: 79 U/L (ref 39–117)
ALT SERPL W P-5'-P-CCNC: 12 U/L (ref 1–33)
ANION GAP SERPL CALCULATED.3IONS-SCNC: 14.3 MMOL/L (ref 5–15)
AST SERPL-CCNC: 16 U/L (ref 1–32)
BILIRUB SERPL-MCNC: 0.2 MG/DL (ref 0–1.2)
BUN SERPL-MCNC: 13 MG/DL (ref 8–23)
BUN/CREAT SERPL: 14.4 (ref 7–25)
CALCIUM SPEC-SCNC: 9.3 MG/DL (ref 8.6–10.5)
CHLORIDE SERPL-SCNC: 105 MMOL/L (ref 98–107)
CHOLEST SERPL-MCNC: 209 MG/DL (ref 0–200)
CO2 SERPL-SCNC: 23.7 MMOL/L (ref 22–29)
CREAT SERPL-MCNC: 0.9 MG/DL (ref 0.57–1)
GFR SERPL CREATININE-BSD FRML MDRD: 62 ML/MIN/1.73
GLOBULIN UR ELPH-MCNC: 2.7 GM/DL
GLUCOSE SERPL-MCNC: 96 MG/DL (ref 65–99)
HDLC SERPL-MCNC: 34 MG/DL (ref 40–60)
LDLC SERPL CALC-MCNC: 114 MG/DL (ref 0–100)
LDLC/HDLC SERPL: 3.07 {RATIO}
POTASSIUM SERPL-SCNC: 3.8 MMOL/L (ref 3.5–5.2)
PROT SERPL-MCNC: 7.1 G/DL (ref 6–8.5)
SODIUM SERPL-SCNC: 143 MMOL/L (ref 136–145)
TRIGL SERPL-MCNC: 353 MG/DL (ref 0–150)
VLDLC SERPL-MCNC: 61 MG/DL (ref 5–40)

## 2020-12-02 ENCOUNTER — OFFICE VISIT (OUTPATIENT)
Dept: INTERNAL MEDICINE | Facility: CLINIC | Age: 70
End: 2020-12-02

## 2020-12-02 VITALS
BODY MASS INDEX: 34.77 KG/M2 | HEART RATE: 72 BPM | SYSTOLIC BLOOD PRESSURE: 120 MMHG | OXYGEN SATURATION: 98 % | HEIGHT: 59 IN | WEIGHT: 172.5 LBS | DIASTOLIC BLOOD PRESSURE: 80 MMHG | RESPIRATION RATE: 16 BRPM | TEMPERATURE: 98.3 F

## 2020-12-02 DIAGNOSIS — I10 ESSENTIAL HYPERTENSION: Primary | ICD-10-CM

## 2020-12-02 DIAGNOSIS — R25.2 LEG CRAMPS: ICD-10-CM

## 2020-12-02 DIAGNOSIS — E78.2 MIXED HYPERLIPIDEMIA: ICD-10-CM

## 2020-12-02 DIAGNOSIS — E66.09 CLASS 1 OBESITY DUE TO EXCESS CALORIES WITH SERIOUS COMORBIDITY AND BODY MASS INDEX (BMI) OF 34.0 TO 34.9 IN ADULT: ICD-10-CM

## 2020-12-02 DIAGNOSIS — F17.210 CIGARETTE SMOKER: ICD-10-CM

## 2020-12-02 DIAGNOSIS — Z87.891 PERSONAL HISTORY OF NICOTINE DEPENDENCE: ICD-10-CM

## 2020-12-02 DIAGNOSIS — R39.15 URINARY URGENCY: ICD-10-CM

## 2020-12-02 PROBLEM — Z12.11 ENCOUNTER FOR SCREENING FOR MALIGNANT NEOPLASM OF COLON: Status: RESOLVED | Noted: 2020-06-18 | Resolved: 2020-12-02

## 2020-12-02 PROCEDURE — 99214 OFFICE O/P EST MOD 30 MIN: CPT | Performed by: INTERNAL MEDICINE

## 2020-12-02 PROCEDURE — G0439 PPPS, SUBSEQ VISIT: HCPCS | Performed by: INTERNAL MEDICINE

## 2020-12-02 RX ORDER — AMLODIPINE BESYLATE 10 MG/1
10 TABLET ORAL DAILY
Qty: 90 TABLET | Refills: 1 | Status: SHIPPED | OUTPATIENT
Start: 2020-12-02 | End: 2021-06-03 | Stop reason: SDUPTHER

## 2020-12-02 RX ORDER — ROSUVASTATIN CALCIUM 5 MG/1
5 TABLET, COATED ORAL DAILY
Qty: 90 TABLET | Refills: 1 | Status: SHIPPED | OUTPATIENT
Start: 2020-12-02 | End: 2021-06-03 | Stop reason: SDUPTHER

## 2020-12-02 RX ORDER — OLMESARTAN MEDOXOMIL 40 MG/1
40 TABLET ORAL DAILY
Qty: 90 TABLET | Refills: 1 | Status: SHIPPED | OUTPATIENT
Start: 2020-12-02 | End: 2021-06-03 | Stop reason: SDUPTHER

## 2020-12-02 NOTE — PATIENT INSTRUCTIONS
Medicare Wellness  Personal Prevention Plan of Service     Date of Office Visit:  2020  Encounter Provider:  Christopher Jo DO  Place of Service:  [facility] INTERNAL MEDICINE  Patient Name: Hillary Mann  :  1950    As part of the Medicare Wellness portion of your visit today, we are providing you with this personalized preventive plan of services (PPPS). This plan is based upon recommendations of the [place] Preventive Services Task Force (USPSTF) and the Advisory Committee on Immunization Practices (ACIP).    This lists the preventive care services that should be considered, and provides dates of when you are due. Items listed as completed are up-to-date and do not require any further intervention.    Health Maintenance   Topic Date Due   • TDAP/TD VACCINES (1 - Tdap) 1971   • ZOSTER VACCINE (1 of 2) 2002   • ANNUAL WELLNESS VISIT  2020   • INFLUENZA VACCINE  2021 (Originally 2020)   • LUNG CANCER SCREENING  2021   • LIPID PANEL  2021   • MAMMOGRAM  2021   • DXA SCAN  2021   • COLONOSCOPY  2025   • HEPATITIS C SCREENING  Completed   • Pneumococcal Vaccine 65+  Completed       Orders Placed This Encounter   Procedures   • CT Chest Low Dose Wo     Standing Status:   Future     Standing Expiration Date:   2021     Order Specific Question:   The patient is age 55-77:     Answer:   70     Order Specific Question:   The patient is a current smoker?     Answer:   Yes     Order Specific Question:   The patient has a smoking history of 30 pack-years or greater:     Answer:   Yes     Order Specific Question:   Actual pack - year smoking history (number):     Answer:   90     Order Specific Question:   Has the Patient had a Chest CT scan within the past 12 months?     Answer:   No     Order Specific Question:   Does the patient have any clinical signs/symptoms of lung cancer?     Answer:   No     Order Specific  Question:   The patient was engaged in shared decision-making for this test:     Answer:   Yes       Return in about 6 months (around 6/2/2021) for Recheck.

## 2020-12-02 NOTE — PROGRESS NOTES
"CC: f/u hypertension    History:  Hillary Mann is a 70 y.o. female   She notes she has been doing reasonably well without any recent illness.  However, she does have leg cramps that bother her primarily in the night and mostly in her calves.  She was having charley horses previously, but these resolved when she stopped her statin.    Her blood pressure has done well on current medication without side effects, though she does note some urinary frequency and urgency with the use of her HCTZ.    She has been cutting down on smoking and is hopeful to set a quit date and get rid of them completely very soon.  She is smoking only a half a cigarette at a time and a pack takes her 2 days to get through.       ROS:  Review of Systems   Constitutional: Negative for chills and fever.   Respiratory: Negative for cough and shortness of breath.    Cardiovascular: Negative for chest pain and palpitations.   Gastrointestinal: Negative for abdominal pain and constipation.   Musculoskeletal: Positive for myalgias.        reports that she has been smoking. She has been smoking about 0.50 packs per day. She has never used smokeless tobacco. She reports that she does not drink alcohol or use drugs.      Current Outpatient Medications:   •  amLODIPine (NORVASC) 10 MG tablet, Take 1 tablet by mouth Daily., Disp: 90 tablet, Rfl: 1  •  aspirin 81 MG chewable tablet, Chew 81 mg Daily., Disp: , Rfl:   •  Cholecalciferol (VITAMIN D) 2000 units capsule, Take 4,000 Units by mouth Daily., Disp: , Rfl:   •  olmesartan (BENICAR) 40 MG tablet, Take 1 tablet by mouth Daily., Disp: 90 tablet, Rfl: 1  •  rosuvastatin (Crestor) 5 MG tablet, Take 1 tablet by mouth Daily., Disp: 90 tablet, Rfl: 1    OBJECTIVE:  /80 (BP Location: Left arm, Patient Position: Sitting, Cuff Size: Adult)   Pulse 72   Temp 98.3 °F (36.8 °C)   Resp 16   Ht 149.9 cm (59\")   Wt 78.2 kg (172 lb 8 oz)   SpO2 98%   Breastfeeding No   BMI 34.84 kg/m²    Physical " Exam  Constitutional:       General: She is not in acute distress.  Pulmonary:      Effort: Pulmonary effort is normal. No respiratory distress.   Neurological:      Mental Status: She is alert and oriented to person, place, and time.         Assessment/Plan     Diagnoses and all orders for this visit:    1. Essential hypertension (Primary)  -     amLODIPine (NORVASC) 10 MG tablet; Take 1 tablet by mouth Daily.  Dispense: 90 tablet; Refill: 1  -     olmesartan (BENICAR) 40 MG tablet; Take 1 tablet by mouth Daily.  Dispense: 90 tablet; Refill: 1  Well controlled, BP goal for age is <140/90 per JNC 8 guidelines, continue current medications and Discontinue HCTZ while increasing amlodipine to avoid loss of control of blood pressure, though we will hopefully affect her urgency.    2. Urinary urgency  D/c HCTZ and encourage kegels.     3. Leg cramps  Encourage stretching, securing insoles or better  4. Mixed hyperlipidemia  -     rosuvastatin (Crestor) 5 MG tablet; Take 1 tablet by mouth Daily.  Dispense: 90 tablet; Refill: 1  Restart moderate intensity statin given 10 year risk of 17.5%. We can adjust dosing frequency or strength if needed if myalgias worsen.     5. Class 1 obesity due to excess calories with serious comorbidity and body mass index (BMI) of 34.0 to 34.9 in adult  Recommended attention to portion control and being careful about the types and timing of meals for the purpose of weight management.    6. Cigarette smoker  She has been cutting back and is hopeful toward quitting very soon. She is encouraged in this attempt.     7. Personal history of nicotine dependence  -     CT Chest Low Dose Wo; Future  Lung cancer screening due in March.    An After Visit Summary was printed and given to the patient at discharge.  Return in about 6 months (around 6/2/2021) for Recheck.          Christopher Jo D.O. 12/2/2020   Electronically signed.

## 2020-12-02 NOTE — PROGRESS NOTES
The ABCs of the Annual Wellness Visit  Subsequent Medicare Wellness Visit    No chief complaint on file.  See  note    Subjective   History of Present Illness:  Hillary Mann is a 70 y.o. female who presents for a Subsequent Medicare Wellness Visit.    HEALTH RISK ASSESSMENT    Recent Hospitalizations:  No hospitalization(s) within the last year.    Current Medical Providers:  Patient Care Team:  Christopher Jo DO as PCP - General (Internal Medicine)    Smoking Status:  Social History     Tobacco Use   Smoking Status Current Every Day Smoker   • Packs/day: 0.50   Smokeless Tobacco Never Used   Tobacco Comment    90 pack year/  cutting back        Alcohol Consumption:  Social History     Substance and Sexual Activity   Alcohol Use No       Depression Screen:   PHQ-2/PHQ-9 Depression Screening 12/2/2020   Little interest or pleasure in doing things 0   Feeling down, depressed, or hopeless 0   Total Score 0       Fall Risk Screen:  ANGEL Fall Risk Assessment was completed, and patient is at LOW risk for falls.Assessment completed on:12/2/2020    Health Habits and Functional and Cognitive Screening:  Functional & Cognitive Status 12/2/2020   Do you have difficulty preparing food and eating? No   Do you have difficulty bathing yourself, getting dressed or grooming yourself? No   Do you have difficulty using the toilet? No   Do you have difficulty moving around from place to place? No   Do you have trouble with steps or getting out of a bed or a chair? No   Current Diet Unhealthy Diet   Dental Exam Up to date   Eye Exam Up to date   Exercise (times per week) 0 times per week   Current Exercises Include No Regular Exercise   Current Exercise Activities Include -   Do you need help using the phone?  No   Are you deaf or do you have serious difficulty hearing?  No   Do you need help with transportation? No   Do you need help shopping? No   Do you need help preparing meals?  No   Do you need help with  housework?  No   Do you need help with laundry? No   Do you need help taking your medications? No   Do you need help managing money? No   Do you ever drive or ride in a car without wearing a seat belt? No   Have you felt unusual stress, anger or loneliness in the last month? No   Who do you live with? Spouse   If you need help, do you have trouble finding someone available to you? No   Have you been bothered in the last four weeks by sexual problems? No   Do you have difficulty concentrating, remembering or making decisions? No         Does the patient have evidence of cognitive impairment? No    Asprin use counseling:Does not need AS but is currently taking (discussed benefits vs risks and patient elects to stay on ASA)    Age-appropriate Screening Schedule:  Refer to the list below for future screening recommendations based on patient's age, sex and/or medical conditions. Orders for these recommended tests are listed in the plan section. The patient has been provided with a written plan.    Health Maintenance   Topic Date Due   • TDAP/TD VACCINES (1 - Tdap) 08/22/1971   • ZOSTER VACCINE (1 of 2) 08/22/2002   • INFLUENZA VACCINE  03/31/2021 (Originally 8/1/2020)   • LIPID PANEL  11/27/2021   • MAMMOGRAM  12/24/2021   • DXA SCAN  12/24/2021   • COLONOSCOPY  06/23/2025          The following portions of the patient's history were reviewed and updated as appropriate: allergies, current medications, past family history, past medical history, past social history, past surgical history and problem list.    Outpatient Medications Prior to Visit   Medication Sig Dispense Refill   • aspirin 81 MG chewable tablet Chew 81 mg Daily.     • Cholecalciferol (VITAMIN D) 2000 units capsule Take 4,000 Units by mouth Daily.     • amLODIPine (NORVASC) 5 MG tablet Take 1 tablet by mouth Daily. 90 tablet 3   • calcium carbonate (OS-DOUG) 600 MG tablet Take 600 mg by mouth Daily.     • olmesartan-hydrochlorothiazide (BENICAR HCT) 40-12.5  "MG per tablet Take 1 tablet by mouth Daily. 90 tablet 3   • sodium-potassium-magnesium sulfates (Suprep Bowel Prep Kit) 17.5-3.13-1.6 GM/177ML solution oral solution Take as directed 1 bottle 0   • atorvastatin (LIPITOR) 20 MG tablet Take 1 tablet by mouth Daily. 90 tablet 3     No facility-administered medications prior to visit.        Patient Active Problem List   Diagnosis   • Essential hypertension   • Class 1 obesity due to excess calories with serious comorbidity and body mass index (BMI) of 34.0 to 34.9 in adult   • Vitamin D deficiency   • Cigarette smoker   • Mild mitral regurgitation   • Osteopenia of left hip   • Urge incontinence of urine   • Mixed hyperlipidemia       Advanced Care Planning:  ACP discussion was held with the patient during this visit. Patient has an advance directive (not in EMR), copy requested.    Review of Systems See  note    Compared to one year ago, the patient feels her physical health is better.  Compared to one year ago, the patient feels her mental health is better.    Reviewed chart for potential of high risk medication in the elderly: yes  Reviewed chart for potential of harmful drug interactions in the elderly:yes    Objective         Vitals:    12/02/20 1057   BP: 120/80   BP Location: Left arm   Patient Position: Sitting   Cuff Size: Adult   Pulse: 72   Resp: 16   Temp: 98.3 °F (36.8 °C)   SpO2: 98%   Weight: 78.2 kg (172 lb 8 oz)   Height: 149.9 cm (59\")       Body mass index is 34.84 kg/m².  Discussed the patient's BMI with her. The BMI is above average; BMI management plan is completed.    Physical Exam See  note    Lab Results   Component Value Date    TRIG 353 (H) 11/27/2020    HDL 34 (L) 11/27/2020     (H) 11/27/2020    VLDL 61 (H) 11/27/2020        Assessment/Plan   Medicare Risks and Personalized Health Plan  CMS Preventative Services Quick Reference  Advance Directive Discussion  Breast Cancer/Mammogram Screening  Cardiovascular " risk  Fall Risk  Immunizations Discussed/Encouraged (specific immunizations; adacel Tdap, Influenza and Shingrix )  Lung Cancer Risk  Obesity/Overweight   Osteoprorosis Risk    The above risks/problems have been discussed with the patient.  Pertinent information has been shared with the patient in the After Visit Summary.  Follow up plans and orders are seen below in the Assessment/Plan Section.    Diagnoses and all orders for this visit:    1. Essential hypertension (Primary)  -     amLODIPine (NORVASC) 10 MG tablet; Take 1 tablet by mouth Daily.  Dispense: 90 tablet; Refill: 1  -     olmesartan (BENICAR) 40 MG tablet; Take 1 tablet by mouth Daily.  Dispense: 90 tablet; Refill: 1    2. Leg cramps    3. Mixed hyperlipidemia  -     rosuvastatin (Crestor) 5 MG tablet; Take 1 tablet by mouth Daily.  Dispense: 90 tablet; Refill: 1    4. Class 1 obesity due to excess calories with serious comorbidity and body mass index (BMI) of 34.0 to 34.9 in adult    5. Cigarette smoker    6. Personal history of nicotine dependence  -     CT Chest Low Dose Wo; Future      Follow Up:  Return in about 6 months (around 6/2/2021) for Recheck.     An After Visit Summary and PPPS were given to the patient.

## 2020-12-28 ENCOUNTER — HOSPITAL ENCOUNTER (OUTPATIENT)
Dept: MAMMOGRAPHY | Facility: HOSPITAL | Age: 70
Discharge: HOME | End: 2020-12-28
Admitting: INTERNAL MEDICINE

## 2020-12-28 DIAGNOSIS — Z12.31 ENCOUNTER FOR SCREENING MAMMOGRAM FOR MALIGNANT NEOPLASM OF BREAST: ICD-10-CM

## 2020-12-28 PROCEDURE — 77063 BREAST TOMOSYNTHESIS BI: CPT

## 2020-12-28 PROCEDURE — 77067 SCR MAMMO BI INCL CAD: CPT

## 2021-03-12 ENCOUNTER — DOCUMENTATION (OUTPATIENT)
Dept: CT IMAGING | Facility: HOSPITAL | Age: 71
End: 2021-03-12

## 2021-03-12 ENCOUNTER — HOSPITAL ENCOUNTER (OUTPATIENT)
Dept: CT IMAGING | Facility: HOSPITAL | Age: 71
Discharge: HOME | End: 2021-03-12
Admitting: INTERNAL MEDICINE

## 2021-03-12 DIAGNOSIS — Z87.891 PERSONAL HISTORY OF NICOTINE DEPENDENCE: ICD-10-CM

## 2021-03-12 PROCEDURE — 71271 CT THORAX LUNG CANCER SCR C-: CPT

## 2021-06-03 ENCOUNTER — OFFICE VISIT (OUTPATIENT)
Dept: INTERNAL MEDICINE | Facility: CLINIC | Age: 71
End: 2021-06-03

## 2021-06-03 ENCOUNTER — LAB (OUTPATIENT)
Dept: LAB | Facility: HOSPITAL | Age: 71
End: 2021-06-03

## 2021-06-03 VITALS
SYSTOLIC BLOOD PRESSURE: 124 MMHG | HEIGHT: 59 IN | OXYGEN SATURATION: 98 % | BODY MASS INDEX: 32.9 KG/M2 | HEART RATE: 84 BPM | WEIGHT: 163.2 LBS | DIASTOLIC BLOOD PRESSURE: 82 MMHG | TEMPERATURE: 98.1 F | RESPIRATION RATE: 16 BRPM

## 2021-06-03 DIAGNOSIS — E66.09 CLASS 1 OBESITY DUE TO EXCESS CALORIES WITH SERIOUS COMORBIDITY AND BODY MASS INDEX (BMI) OF 32.0 TO 32.9 IN ADULT: ICD-10-CM

## 2021-06-03 DIAGNOSIS — R25.2 MUSCLE CRAMPS: Primary | ICD-10-CM

## 2021-06-03 DIAGNOSIS — R25.2 MUSCLE CRAMPS: ICD-10-CM

## 2021-06-03 DIAGNOSIS — I10 ESSENTIAL HYPERTENSION: ICD-10-CM

## 2021-06-03 DIAGNOSIS — F17.210 CIGARETTE SMOKER: ICD-10-CM

## 2021-06-03 DIAGNOSIS — E78.2 MIXED HYPERLIPIDEMIA: ICD-10-CM

## 2021-06-03 LAB
ALBUMIN SERPL-MCNC: 4.7 G/DL (ref 3.5–5.2)
ALBUMIN/GLOB SERPL: 1.6 G/DL
ALP SERPL-CCNC: 108 U/L (ref 39–117)
ALT SERPL W P-5'-P-CCNC: 12 U/L (ref 1–33)
ANION GAP SERPL CALCULATED.3IONS-SCNC: 10.3 MMOL/L (ref 5–15)
AST SERPL-CCNC: 14 U/L (ref 1–32)
BASOPHILS # BLD MANUAL: 0.19 10*3/MM3 (ref 0–0.2)
BASOPHILS NFR BLD AUTO: 2 % (ref 0–1.5)
BILIRUB SERPL-MCNC: 0.2 MG/DL (ref 0–1.2)
BUN SERPL-MCNC: 11 MG/DL (ref 8–23)
BUN/CREAT SERPL: 12.9 (ref 7–25)
CALCIUM SPEC-SCNC: 9.4 MG/DL (ref 8.6–10.5)
CHLORIDE SERPL-SCNC: 105 MMOL/L (ref 98–107)
CHOLEST SERPL-MCNC: 147 MG/DL (ref 0–200)
CK SERPL-CCNC: 53 U/L (ref 20–180)
CO2 SERPL-SCNC: 26.7 MMOL/L (ref 22–29)
CREAT SERPL-MCNC: 0.85 MG/DL (ref 0.57–1)
DEPRECATED RDW RBC AUTO: 41.7 FL (ref 37–54)
EOSINOPHIL # BLD MANUAL: 0.19 10*3/MM3 (ref 0–0.4)
EOSINOPHIL NFR BLD MANUAL: 2 % (ref 0.3–6.2)
ERYTHROCYTE [DISTWIDTH] IN BLOOD BY AUTOMATED COUNT: 13.3 % (ref 12.3–15.4)
ERYTHROCYTE [SEDIMENTATION RATE] IN BLOOD: 14 MM/HR (ref 0–30)
GFR SERPL CREATININE-BSD FRML MDRD: 67 ML/MIN/1.73
GLOBULIN UR ELPH-MCNC: 3 GM/DL
GLUCOSE SERPL-MCNC: 98 MG/DL (ref 65–99)
HCT VFR BLD AUTO: 40.5 % (ref 34–46.6)
HDLC SERPL-MCNC: 45 MG/DL (ref 40–60)
HGB BLD-MCNC: 13.8 G/DL (ref 12–15.9)
LDLC SERPL CALC-MCNC: 68 MG/DL (ref 0–100)
LDLC/HDLC SERPL: 1.34 {RATIO}
LYMPHOCYTES # BLD MANUAL: 2.75 10*3/MM3 (ref 0.7–3.1)
LYMPHOCYTES NFR BLD MANUAL: 2 % (ref 5–12)
LYMPHOCYTES NFR BLD MANUAL: 28.6 % (ref 19.6–45.3)
MCH RBC QN AUTO: 29.1 PG (ref 26.6–33)
MCHC RBC AUTO-ENTMCNC: 34.1 G/DL (ref 31.5–35.7)
MCV RBC AUTO: 85.4 FL (ref 79–97)
MONOCYTES # BLD AUTO: 0.19 10*3/MM3 (ref 0.1–0.9)
MYELOCYTES NFR BLD MANUAL: 1 % (ref 0–0)
NEUTROPHILS # BLD AUTO: 6.18 10*3/MM3 (ref 1.7–7)
NEUTROPHILS NFR BLD MANUAL: 64.3 % (ref 42.7–76)
PLAT MORPH BLD: NORMAL
PLATELET # BLD AUTO: 415 10*3/MM3 (ref 140–450)
PMV BLD AUTO: 10.3 FL (ref 6–12)
POTASSIUM SERPL-SCNC: 3.5 MMOL/L (ref 3.5–5.2)
PROT SERPL-MCNC: 7.7 G/DL (ref 6–8.5)
RBC # BLD AUTO: 4.74 10*6/MM3 (ref 3.77–5.28)
RBC MORPH BLD: NORMAL
SMUDGE CELLS BLD QL SMEAR: ABNORMAL
SODIUM SERPL-SCNC: 142 MMOL/L (ref 136–145)
TRIGL SERPL-MCNC: 209 MG/DL (ref 0–150)
TSH SERPL DL<=0.05 MIU/L-ACNC: 1.8 UIU/ML (ref 0.27–4.2)
VLDLC SERPL-MCNC: 34 MG/DL (ref 5–40)
WBC # BLD AUTO: 9.61 10*3/MM3 (ref 3.4–10.8)

## 2021-06-03 PROCEDURE — 36415 COLL VENOUS BLD VENIPUNCTURE: CPT

## 2021-06-03 PROCEDURE — 85652 RBC SED RATE AUTOMATED: CPT

## 2021-06-03 PROCEDURE — 80053 COMPREHEN METABOLIC PANEL: CPT

## 2021-06-03 PROCEDURE — 80061 LIPID PANEL: CPT

## 2021-06-03 PROCEDURE — 85007 BL SMEAR W/DIFF WBC COUNT: CPT

## 2021-06-03 PROCEDURE — 82550 ASSAY OF CK (CPK): CPT

## 2021-06-03 PROCEDURE — 84443 ASSAY THYROID STIM HORMONE: CPT

## 2021-06-03 PROCEDURE — 99214 OFFICE O/P EST MOD 30 MIN: CPT | Performed by: INTERNAL MEDICINE

## 2021-06-03 PROCEDURE — 85025 COMPLETE CBC W/AUTO DIFF WBC: CPT

## 2021-06-03 RX ORDER — OLMESARTAN MEDOXOMIL 40 MG/1
40 TABLET ORAL DAILY
Qty: 90 TABLET | Refills: 1 | Status: SHIPPED | OUTPATIENT
Start: 2021-06-03 | End: 2021-12-20 | Stop reason: SDUPTHER

## 2021-06-03 RX ORDER — ROSUVASTATIN CALCIUM 5 MG/1
5 TABLET, COATED ORAL DAILY
Qty: 90 TABLET | Refills: 1 | Status: SHIPPED | OUTPATIENT
Start: 2021-06-03 | End: 2021-11-23 | Stop reason: SINTOL

## 2021-06-03 RX ORDER — AMLODIPINE BESYLATE 10 MG/1
10 TABLET ORAL DAILY
Qty: 90 TABLET | Refills: 1 | Status: SHIPPED | OUTPATIENT
Start: 2021-06-03 | End: 2021-12-20 | Stop reason: SDUPTHER

## 2021-06-03 NOTE — PROGRESS NOTES
"CC: muscle cramps    History:  Hillary Mann is a 70 y.o. female   She notes she has done well except for some cramps in her legs that bother her primarily in the evening, but are quite painful. Her BP has done well and she has no recent illness.        ROS:  Review of Systems   Constitutional: Negative for chills and fever.   Respiratory: Negative for cough and shortness of breath.    Cardiovascular: Negative for chest pain and palpitations.   Gastrointestinal: Negative for abdominal pain and constipation.   Genitourinary: Negative for difficulty urinating and dysuria.   Musculoskeletal: Positive for myalgias.        reports that she has been smoking. She has been smoking about 0.50 packs per day. She has never used smokeless tobacco. She reports that she does not drink alcohol and does not use drugs.      Current Outpatient Medications:   •  amLODIPine (NORVASC) 10 MG tablet, Take 1 tablet by mouth Daily., Disp: 90 tablet, Rfl: 1  •  aspirin 81 MG chewable tablet, Chew 81 mg Daily., Disp: , Rfl:   •  Cholecalciferol (VITAMIN D) 2000 units capsule, Take 4,000 Units by mouth Daily., Disp: , Rfl:   •  olmesartan (BENICAR) 40 MG tablet, Take 1 tablet by mouth Daily., Disp: 90 tablet, Rfl: 1  •  rosuvastatin (Crestor) 5 MG tablet, Take 1 tablet by mouth Daily., Disp: 90 tablet, Rfl: 1    OBJECTIVE:  /82 (BP Location: Left arm, Patient Position: Sitting, Cuff Size: Adult)   Pulse 84   Temp 98.1 °F (36.7 °C)   Resp 16   Ht 149.9 cm (59\")   Wt 74 kg (163 lb 3.2 oz)   SpO2 98%   Breastfeeding No   BMI 32.96 kg/m²    Physical Exam  Constitutional:       General: She is not in acute distress.  Pulmonary:      Effort: Pulmonary effort is normal. No respiratory distress.   Neurological:      Mental Status: She is alert and oriented to person, place, and time.         Assessment/Plan     Diagnoses and all orders for this visit:    1. Muscle cramps (Primary)  -     TSH; Future  -     CK; Future  -     " Sedimentation Rate; Future  Labs to evaluate for organic cause, but recommend exercise and stretching to combat.     2. Essential hypertension  -     olmesartan (BENICAR) 40 MG tablet; Take 1 tablet by mouth Daily.  Dispense: 90 tablet; Refill: 1  -     amLODIPine (NORVASC) 10 MG tablet; Take 1 tablet by mouth Daily.  Dispense: 90 tablet; Refill: 1  -     Comprehensive Metabolic Panel; Future  -     CBC & Differential; Future  Well controlled, BP goal for age is <140/90 per JNC 8 guidelines and continue current medications    3. Mixed hyperlipidemia  -     rosuvastatin (Crestor) 5 MG tablet; Take 1 tablet by mouth Daily.  Dispense: 90 tablet; Refill: 1  -     Lipid Panel; Future  Stable on moderate intensity statin therapy per ACC/AHA guidelines.    4. Cigarette smoker  Patient was counseled on and understood the many dangers of continuing to use tobacco. Despite this, Ms. Mann states quitting is not an immediate priority at this time. I reminded the patient that if quitting becomes an increased priority to contact us for help with quitting including pharmacologic & nonpharmacologic options or any additional resources.    5. Class 1 obesity due to excess calories with serious comorbidity and body mass index (BMI) of 32.0 to 32.9 in adult  Recommended attention to portion control and being careful about the types and timing of meals for the purpose of weight management.    An After Visit Summary was printed and given to the patient at discharge.  Return in about 6 months (around 12/3/2021) for Medicare Wellness.          Christopher Jo D.O. 6/6/2021   Electronically signed.

## 2021-06-04 DIAGNOSIS — R25.2 NOCTURNAL MUSCLE CRAMP: Primary | ICD-10-CM

## 2021-06-04 RX ORDER — TIZANIDINE 2 MG/1
2 TABLET ORAL NIGHTLY PRN
Qty: 30 TABLET | Refills: 0 | Status: SHIPPED | OUTPATIENT
Start: 2021-06-04 | End: 2021-07-06

## 2021-06-19 DIAGNOSIS — R25.2 NOCTURNAL MUSCLE CRAMP: ICD-10-CM

## 2021-06-21 RX ORDER — TIZANIDINE 2 MG/1
2 TABLET ORAL NIGHTLY PRN
Qty: 30 TABLET | Refills: 0 | OUTPATIENT
Start: 2021-06-21

## 2021-07-06 DIAGNOSIS — R25.2 NOCTURNAL MUSCLE CRAMP: ICD-10-CM

## 2021-07-06 RX ORDER — TIZANIDINE 2 MG/1
2 TABLET ORAL NIGHTLY PRN
Qty: 30 TABLET | Refills: 1 | Status: SHIPPED | OUTPATIENT
Start: 2021-07-06 | End: 2021-08-09

## 2021-07-12 ENCOUNTER — TELEPHONE (OUTPATIENT)
Dept: INTERNAL MEDICINE | Facility: CLINIC | Age: 71
End: 2021-07-12

## 2021-07-12 NOTE — TELEPHONE ENCOUNTER
She may increase to taking 2 tablets of tizanidine and should focus on getting some exercise for her legs in the evenings to help decrease risk of cramps.

## 2021-07-12 NOTE — TELEPHONE ENCOUNTER
Patient called she states that the Tizanidine 2 mg, helped her fall asleep, but only for a short time, her legs are hurting so bad she has only been getting 2 hours sleep at night.   She stated that she is miserable from the leg pain and not getting any sleep, please advise.

## 2021-08-07 DIAGNOSIS — R25.2 NOCTURNAL MUSCLE CRAMP: ICD-10-CM

## 2021-08-09 DIAGNOSIS — R25.2 NOCTURNAL MUSCLE CRAMP: ICD-10-CM

## 2021-08-09 RX ORDER — TIZANIDINE 2 MG/1
2 TABLET ORAL NIGHTLY PRN
Qty: 60 TABLET | Refills: 3 | Status: SHIPPED | OUTPATIENT
Start: 2021-08-09 | End: 2021-09-17 | Stop reason: SDUPTHER

## 2021-08-09 RX ORDER — TIZANIDINE 2 MG/1
2 TABLET ORAL NIGHTLY PRN
Qty: 30 TABLET | Refills: 1 | Status: SHIPPED | OUTPATIENT
Start: 2021-08-09 | End: 2021-08-09 | Stop reason: SDUPTHER

## 2021-09-17 ENCOUNTER — TELEPHONE (OUTPATIENT)
Dept: INTERNAL MEDICINE | Facility: CLINIC | Age: 71
End: 2021-09-17

## 2021-09-17 DIAGNOSIS — R25.2 NOCTURNAL MUSCLE CRAMP: ICD-10-CM

## 2021-09-17 RX ORDER — TIZANIDINE 2 MG/1
2-4 TABLET ORAL NIGHTLY PRN
Qty: 60 TABLET | Refills: 3 | Status: SHIPPED | OUTPATIENT
Start: 2021-09-17 | End: 2022-05-24

## 2021-09-17 NOTE — TELEPHONE ENCOUNTER
PATIENT HAS CALLED, SHE STATED THAT THE MUSCLE RELAXER SCRIPT AT [facility] IN [place] STATED 1 TABLET DAILY.  THEY NEED A NEW PRESCRIPTION THAT STATES THAT SHE TAKE 2 TABLETS DAILY.  PHARMACY WILL NOT FILL BECAUSE THEY STATED IT IS TOO SOON TO FILL.

## 2021-10-25 ENCOUNTER — HOSPITAL ENCOUNTER (OUTPATIENT)
Dept: GENERAL RADIOLOGY | Facility: HOSPITAL | Age: 71
Discharge: HOME | End: 2021-10-25

## 2021-10-25 ENCOUNTER — OFFICE VISIT (OUTPATIENT)
Dept: INTERNAL MEDICINE | Facility: CLINIC | Age: 71
End: 2021-10-25

## 2021-10-25 VITALS
WEIGHT: 157.4 LBS | BODY MASS INDEX: 31.73 KG/M2 | TEMPERATURE: 98.2 F | RESPIRATION RATE: 16 BRPM | OXYGEN SATURATION: 98 % | DIASTOLIC BLOOD PRESSURE: 80 MMHG | SYSTOLIC BLOOD PRESSURE: 152 MMHG | HEIGHT: 59 IN | HEART RATE: 68 BPM

## 2021-10-25 DIAGNOSIS — F17.210 CIGARETTE SMOKER: ICD-10-CM

## 2021-10-25 DIAGNOSIS — M25.562 CHRONIC PAIN OF BOTH KNEES: ICD-10-CM

## 2021-10-25 DIAGNOSIS — M25.561 CHRONIC PAIN OF BOTH KNEES: ICD-10-CM

## 2021-10-25 DIAGNOSIS — M25.562 CHRONIC PAIN OF BOTH KNEES: Primary | ICD-10-CM

## 2021-10-25 DIAGNOSIS — G89.29 CHRONIC PAIN OF BOTH KNEES: ICD-10-CM

## 2021-10-25 DIAGNOSIS — M25.561 CHRONIC PAIN OF BOTH KNEES: Primary | ICD-10-CM

## 2021-10-25 DIAGNOSIS — I10 ESSENTIAL HYPERTENSION: ICD-10-CM

## 2021-10-25 DIAGNOSIS — G89.29 CHRONIC PAIN OF BOTH KNEES: Primary | ICD-10-CM

## 2021-10-25 DIAGNOSIS — E66.09 CLASS 1 OBESITY DUE TO EXCESS CALORIES WITH SERIOUS COMORBIDITY AND BODY MASS INDEX (BMI) OF 31.0 TO 31.9 IN ADULT: ICD-10-CM

## 2021-10-25 DIAGNOSIS — E78.2 MIXED HYPERLIPIDEMIA: ICD-10-CM

## 2021-10-25 PROCEDURE — 73562 X-RAY EXAM OF KNEE 3: CPT

## 2021-10-25 PROCEDURE — 99214 OFFICE O/P EST MOD 30 MIN: CPT | Performed by: INTERNAL MEDICINE

## 2021-10-25 NOTE — PROGRESS NOTES
"CC: bilateral knee pain    History:  Hillary Mann is a 71 y.o. female   She notes she has been having significant pain in her bilateral knees that is centered mostly around her patella.  She has difficulty with climbing and descending stairs as well as full extension at the knee.  She has significant cracking and popping that she hears.  She also has pain in the popliteal fossa of the right knee with some fullness that waxes and wanes.       ROS:  Review of Systems   Respiratory: Negative for cough and shortness of breath.    Cardiovascular: Negative for chest pain and palpitations.   Musculoskeletal: Positive for arthralgias, gait problem, joint swelling and myalgias.        reports that she has been smoking. She started smoking about 51 years ago. She has a 89.25 pack-year smoking history. She has never used smokeless tobacco. She reports that she does not drink alcohol and does not use drugs.      Current Outpatient Medications:   •  amLODIPine (NORVASC) 10 MG tablet, Take 1 tablet by mouth Daily., Disp: 90 tablet, Rfl: 1  •  aspirin 81 MG chewable tablet, Chew 81 mg Daily., Disp: , Rfl:   •  Cholecalciferol (VITAMIN D) 2000 units capsule, Take 4,000 Units by mouth Daily., Disp: , Rfl:   •  olmesartan (BENICAR) 40 MG tablet, Take 1 tablet by mouth Daily., Disp: 90 tablet, Rfl: 1  •  tiZANidine (ZANAFLEX) 2 MG tablet, Take 1-2 tablets by mouth At Night As Needed for Muscle Spasms., Disp: 60 tablet, Rfl: 3  •  rosuvastatin (Crestor) 5 MG tablet, Take 1 tablet by mouth Daily., Disp: 90 tablet, Rfl: 1    OBJECTIVE:  /80 (BP Location: Left arm, Patient Position: Sitting, Cuff Size: Adult)   Pulse 68   Temp 98.2 °F (36.8 °C)   Resp 16   Ht 149.9 cm (59\")   Wt 71.4 kg (157 lb 6.4 oz)   SpO2 98%   Breastfeeding No   BMI 31.79 kg/m²    Physical Exam  Constitutional:       General: She is not in acute distress.  Pulmonary:      Effort: Pulmonary effort is normal. No respiratory distress.   Neurological: "      Mental Status: She is alert and oriented to person, place, and time.         Assessment/Plan     Diagnoses and all orders for this visit:    1. Chronic pain of both knees (Primary)  -     XR knee 3+ vw w sunrise right; Future  -     XR knee 3+ vw w sunrise left; Future  -     Diclofenac Sodium (VOLTAREN) 1 % gel gel; Apply 4 g topically to the appropriate area as directed 4 (Four) Times a Day As Needed (knee pain).  Dispense: 350 g; Refill: 1  Suspect osteoarthritis, likely patellofemoral in nature.  Knee x-rays for further evaluation and we will initiate therapy with Voltaren gel.  She may have a Baker's cyst on the right, which could also be addressed by orthopedics if referral is warranted.    2. Essential hypertension  Fair control, BP goal for age is <140/90 per JNC 8 guidelines and monitor at f/u when pain is hopefully improved.     3. Class 1 obesity due to excess calories with serious comorbidity and body mass index (BMI) of 31.0 to 31.9 in adult  Recommended attention to portion control and being careful about the types and timing of meals for the purpose of weight management.    4. Cigarette smoker  Patient was counseled on and understood the many dangers of continuing to use tobacco. Despite this, Ms. Mann states quitting is not an immediate priority at this time. I reminded the patient that if quitting becomes an increased priority to contact us for help with quitting including pharmacologic & nonpharmacologic options or any additional resources.    5. Mixed hyperlipidemia  Encourage to restart Crestor, even if at every other day to provide CV benefit. Monitor myalgias on this.       An After Visit Summary was printed and given to the patient at discharge.  Return for Next scheduled follow up.          Christopher Jo D.O. 10/25/2021   Electronically signed.

## 2021-11-15 ENCOUNTER — TELEPHONE (OUTPATIENT)
Dept: INTERNAL MEDICINE | Facility: CLINIC | Age: 71
End: 2021-11-15

## 2021-11-15 DIAGNOSIS — I10 ESSENTIAL HYPERTENSION: Primary | ICD-10-CM

## 2021-11-15 RX ORDER — HYDROCHLOROTHIAZIDE 25 MG/1
25 TABLET ORAL DAILY
Qty: 30 TABLET | Refills: 2 | Status: SHIPPED | OUTPATIENT
Start: 2021-11-15 | End: 2022-02-24

## 2021-11-15 RX ORDER — CLONIDINE HYDROCHLORIDE 0.1 MG/1
0.1 TABLET ORAL DAILY PRN
Qty: 30 TABLET | Refills: 1 | Status: SHIPPED | OUTPATIENT
Start: 2021-11-15 | End: 2022-12-05

## 2021-11-15 NOTE — TELEPHONE ENCOUNTER
Patient called she states that her BP has been running high Friday it was running 194/102 through out the day, she had a very bad headache it went up to  210/109 she went to [facility] she had a CT Scan it was negative, today her BP is running 153/110 she has a slight headache nothing like it was on Friday, she was wondering if she needed an appointment or what she needs to do?

## 2021-11-15 NOTE — TELEPHONE ENCOUNTER
Sent Rx for HCTZ to begin immediately. This will take 4-5 days to take full effect. Also sent clonidine to be taken if top number >180 or bottom >100. Hopefully, this will be needed only for a short time until we get things under control. Keep us posted in 1 week with how numbers are doing.    Awake/Alert/Cooperative

## 2021-11-17 ENCOUNTER — TRANSCRIBE ORDERS (OUTPATIENT)
Dept: ADMINISTRATIVE | Facility: HOSPITAL | Age: 71
End: 2021-11-17

## 2021-11-17 DIAGNOSIS — Z12.31 ENCOUNTER FOR SCREENING MAMMOGRAM FOR MALIGNANT NEOPLASM OF BREAST: Primary | ICD-10-CM

## 2021-11-23 ENCOUNTER — OFFICE VISIT (OUTPATIENT)
Dept: INTERNAL MEDICINE | Facility: CLINIC | Age: 71
End: 2021-11-23

## 2021-11-23 ENCOUNTER — LAB (OUTPATIENT)
Dept: LAB | Facility: HOSPITAL | Age: 71
End: 2021-11-23

## 2021-11-23 VITALS
OXYGEN SATURATION: 98 % | WEIGHT: 152.5 LBS | SYSTOLIC BLOOD PRESSURE: 122 MMHG | HEIGHT: 59 IN | BODY MASS INDEX: 30.74 KG/M2 | HEART RATE: 74 BPM | RESPIRATION RATE: 16 BRPM | DIASTOLIC BLOOD PRESSURE: 78 MMHG | TEMPERATURE: 98 F

## 2021-11-23 DIAGNOSIS — I10 ESSENTIAL HYPERTENSION: ICD-10-CM

## 2021-11-23 DIAGNOSIS — F17.210 CIGARETTE SMOKER: ICD-10-CM

## 2021-11-23 DIAGNOSIS — E66.09 CLASS 1 OBESITY DUE TO EXCESS CALORIES WITH SERIOUS COMORBIDITY AND BODY MASS INDEX (BMI) OF 30.0 TO 30.9 IN ADULT: ICD-10-CM

## 2021-11-23 DIAGNOSIS — I10 ESSENTIAL HYPERTENSION: Primary | ICD-10-CM

## 2021-11-23 DIAGNOSIS — Z87.891 PERSONAL HISTORY OF NICOTINE DEPENDENCE: ICD-10-CM

## 2021-11-23 DIAGNOSIS — E78.2 MIXED HYPERLIPIDEMIA: ICD-10-CM

## 2021-11-23 LAB
ANION GAP SERPL CALCULATED.3IONS-SCNC: 12.2 MMOL/L (ref 5–15)
BUN SERPL-MCNC: 16 MG/DL (ref 8–23)
BUN/CREAT SERPL: 17.2 (ref 7–25)
CALCIUM SPEC-SCNC: 10 MG/DL (ref 8.6–10.5)
CHLORIDE SERPL-SCNC: 100 MMOL/L (ref 98–107)
CO2 SERPL-SCNC: 27.8 MMOL/L (ref 22–29)
CREAT SERPL-MCNC: 0.93 MG/DL (ref 0.57–1)
GFR SERPL CREATININE-BSD FRML MDRD: 60 ML/MIN/1.73
GLUCOSE SERPL-MCNC: 101 MG/DL (ref 65–99)
POTASSIUM SERPL-SCNC: 3.6 MMOL/L (ref 3.5–5.2)
SODIUM SERPL-SCNC: 140 MMOL/L (ref 136–145)

## 2021-11-23 PROCEDURE — 80048 BASIC METABOLIC PNL TOTAL CA: CPT

## 2021-11-23 PROCEDURE — 36415 COLL VENOUS BLD VENIPUNCTURE: CPT

## 2021-11-23 PROCEDURE — 99214 OFFICE O/P EST MOD 30 MIN: CPT | Performed by: INTERNAL MEDICINE

## 2021-11-23 RX ORDER — PRAVASTATIN SODIUM 10 MG
10 TABLET ORAL DAILY
Qty: 30 TABLET | Refills: 0 | Status: SHIPPED | OUTPATIENT
Start: 2021-11-23 | End: 2021-12-22

## 2021-11-23 NOTE — PROGRESS NOTES
CC: f/u hypertension    History:  Hillary Mann is a 71 y.o. female   Who presents today for a routine follow up visit. BP has been much better and she has tolerated her medications, including newly added HCTZ. Her only concern today is nighty rosuvastatin induced leg cramps that prompted her to stop the medication. She states her leg cramps immediately stopped after discontinuing the medication and is currently seeking an alternative.       ROS:  Review of Systems   Constitutional: Positive for diaphoresis (only with episodes of high blood pressure) and fatigue (only with episodes of high blood pressure ). Negative for unexpected weight change.   Respiratory: Negative for chest tightness, shortness of breath and wheezing.    Cardiovascular: Negative for chest pain, palpitations and leg swelling.   Gastrointestinal: Negative for diarrhea, nausea and vomiting.        reports that she has been smoking. She started smoking about 51 years ago. She has a 89.25 pack-year smoking history. She has never used smokeless tobacco. She reports that she does not drink alcohol and does not use drugs.      Current Outpatient Medications:   •  amLODIPine (NORVASC) 10 MG tablet, Take 1 tablet by mouth Daily., Disp: 90 tablet, Rfl: 1  •  aspirin 81 MG chewable tablet, Chew 81 mg Daily., Disp: , Rfl:   •  Cholecalciferol (VITAMIN D) 2000 units capsule, Take 4,000 Units by mouth Daily., Disp: , Rfl:   •  Diclofenac Sodium (VOLTAREN) 1 % gel gel, Apply 4 g topically to the appropriate area as directed 4 (Four) Times a Day As Needed (knee pain)., Disp: 350 g, Rfl: 1  •  hydroCHLOROthiazide (HYDRODIURIL) 25 MG tablet, Take 1 tablet by mouth Daily., Disp: 30 tablet, Rfl: 2  •  olmesartan (BENICAR) 40 MG tablet, Take 1 tablet by mouth Daily., Disp: 90 tablet, Rfl: 1  •  tiZANidine (ZANAFLEX) 2 MG tablet, Take 1-2 tablets by mouth At Night As Needed for Muscle Spasms., Disp: 60 tablet, Rfl: 3  •  cloNIDine (Catapres) 0.1 MG tablet, Take  "1 tablet by mouth Daily As Needed for High Blood Pressure (BP >180/100)., Disp: 30 tablet, Rfl: 1  •  rosuvastatin (Crestor) 5 MG tablet, Take 1 tablet by mouth Daily., Disp: 90 tablet, Rfl: 1    OBJECTIVE:  /78 (BP Location: Left arm, Patient Position: Sitting, Cuff Size: Adult)   Pulse 74   Temp 98 °F (36.7 °C)   Resp 16   Ht 149.9 cm (59\")   Wt 69.2 kg (152 lb 8 oz)   SpO2 98%   Breastfeeding No   BMI 30.80 kg/m²    Physical Exam  Constitutional:       General: She is not in acute distress.  Pulmonary:      Effort: Pulmonary effort is normal. No respiratory distress.   Neurological:      Mental Status: She is alert and oriented to person, place, and time.         Assessment/Plan     Diagnoses and all orders for this visit:    1. Essential hypertension (Primary)  -     Basic metabolic panel; Future  Well controlled, BP goal for age is <140/90 per JNC 8 guidelines and continue current medications    2. Cigarette smoker  Patient was counseled on and understood the many dangers of continuing to use tobacco. Despite this, Ms. Mann states quitting is not an immediate priority at this time. I reminded the patient that if quitting becomes an increased priority to contact us for help with quitting including pharmacologic & nonpharmacologic options or any additional resources.    3. Mixed hyperlipidemia  -     pravastatin (PRAVACHOL) 10 MG tablet; Take 1 tablet by mouth Daily.  Dispense: 30 tablet; Refill: 0  She had myalgia with pravastatin. We will switch to low dose pravastatin to provide statin benefit, ideally without myalgias.     4. Class 1 obesity due to excess calories with serious comorbidity and body mass index (BMI) of 30.0 to 30.9 in adult  Recommended attention to portion control and being careful about the types and timing of meals for the purpose of weight management.    5. Personal history of nicotine dependence  -     CT Chest Low Dose Wo; Future  Lung cancer screening ordered for March " 2022 to ensure timely follow-up.     Some portions of this note including history and physical were obtained by a medical student. However, the full history, ROS, physical exam and plan were discussed and reviewed with the student and patient. Physical exam is my own. All elements of this note are reviewed and represent solely my assessment and plan.    An After Visit Summary was printed and given to the patient at discharge.  Return in about 6 months (around 5/23/2022) for Medicare Wellness.          Christopher Jo D.O. 11/24/2021   Electronically signed.

## 2021-12-20 DIAGNOSIS — I10 ESSENTIAL HYPERTENSION: ICD-10-CM

## 2021-12-20 RX ORDER — AMLODIPINE BESYLATE 10 MG/1
10 TABLET ORAL DAILY
Qty: 90 TABLET | Refills: 1 | Status: SHIPPED | OUTPATIENT
Start: 2021-12-20 | End: 2022-07-04

## 2021-12-20 RX ORDER — OLMESARTAN MEDOXOMIL 40 MG/1
40 TABLET ORAL DAILY
Qty: 90 TABLET | Refills: 1 | Status: SHIPPED | OUTPATIENT
Start: 2021-12-20 | End: 2022-07-05 | Stop reason: SDUPTHER

## 2021-12-22 DIAGNOSIS — E78.2 MIXED HYPERLIPIDEMIA: ICD-10-CM

## 2021-12-22 RX ORDER — PRAVASTATIN SODIUM 10 MG
10 TABLET ORAL DAILY
Qty: 90 TABLET | Refills: 3 | Status: SHIPPED | OUTPATIENT
Start: 2021-12-22 | End: 2023-01-16

## 2021-12-29 ENCOUNTER — HOSPITAL ENCOUNTER (OUTPATIENT)
Dept: MAMMOGRAPHY | Facility: HOSPITAL | Age: 71
Discharge: HOME | End: 2021-12-29
Admitting: INTERNAL MEDICINE

## 2021-12-29 DIAGNOSIS — Z12.31 ENCOUNTER FOR SCREENING MAMMOGRAM FOR MALIGNANT NEOPLASM OF BREAST: ICD-10-CM

## 2021-12-29 PROCEDURE — 77067 SCR MAMMO BI INCL CAD: CPT

## 2021-12-29 PROCEDURE — 77063 BREAST TOMOSYNTHESIS BI: CPT

## 2021-12-31 ENCOUNTER — HOSPITAL ENCOUNTER (EMERGENCY)
Facility: HOSPITAL | Age: 71
Discharge: HOME | End: 2022-01-01
Attending: EMERGENCY MEDICINE | Admitting: EMERGENCY MEDICINE

## 2021-12-31 DIAGNOSIS — R55 NEAR SYNCOPE: Primary | ICD-10-CM

## 2021-12-31 LAB — GLUCOSE BLDC GLUCOMTR-MCNC: 82 MG/DL (ref 70–130)

## 2021-12-31 PROCEDURE — 85025 COMPLETE CBC W/AUTO DIFF WBC: CPT | Performed by: EMERGENCY MEDICINE

## 2021-12-31 PROCEDURE — 84484 ASSAY OF TROPONIN QUANT: CPT | Performed by: EMERGENCY MEDICINE

## 2021-12-31 PROCEDURE — 80053 COMPREHEN METABOLIC PANEL: CPT | Performed by: EMERGENCY MEDICINE

## 2021-12-31 PROCEDURE — 82962 GLUCOSE BLOOD TEST: CPT

## 2021-12-31 PROCEDURE — 93010 ELECTROCARDIOGRAM REPORT: CPT | Performed by: INTERNAL MEDICINE

## 2021-12-31 PROCEDURE — 99284 EMERGENCY DEPT VISIT MOD MDM: CPT

## 2021-12-31 PROCEDURE — 36415 COLL VENOUS BLD VENIPUNCTURE: CPT

## 2021-12-31 PROCEDURE — 93005 ELECTROCARDIOGRAM TRACING: CPT | Performed by: EMERGENCY MEDICINE

## 2021-12-31 RX ADMIN — SODIUM CHLORIDE, POTASSIUM CHLORIDE, SODIUM LACTATE AND CALCIUM CHLORIDE 500 ML: 600; 310; 30; 20 INJECTION, SOLUTION INTRAVENOUS at 23:41

## 2022-01-01 ENCOUNTER — APPOINTMENT (OUTPATIENT)
Dept: CT IMAGING | Facility: HOSPITAL | Age: 72
End: 2022-01-01

## 2022-01-01 VITALS
HEIGHT: 59 IN | BODY MASS INDEX: 32.25 KG/M2 | DIASTOLIC BLOOD PRESSURE: 65 MMHG | TEMPERATURE: 97.9 F | SYSTOLIC BLOOD PRESSURE: 110 MMHG | RESPIRATION RATE: 17 BRPM | OXYGEN SATURATION: 94 % | WEIGHT: 160 LBS | HEART RATE: 71 BPM

## 2022-01-01 LAB
ALBUMIN SERPL-MCNC: 4.4 G/DL (ref 3.5–5.2)
ALBUMIN/GLOB SERPL: 1.3 G/DL
ALP SERPL-CCNC: 109 U/L (ref 39–117)
ALT SERPL W P-5'-P-CCNC: 11 U/L (ref 1–33)
ANION GAP SERPL CALCULATED.3IONS-SCNC: 12 MMOL/L (ref 5–15)
AST SERPL-CCNC: 20 U/L (ref 1–32)
BASOPHILS # BLD AUTO: 0.14 10*3/MM3 (ref 0–0.2)
BASOPHILS NFR BLD AUTO: 1.2 % (ref 0–1.5)
BILIRUB SERPL-MCNC: 0.2 MG/DL (ref 0–1.2)
BUN SERPL-MCNC: 18 MG/DL (ref 8–23)
BUN/CREAT SERPL: 22.8 (ref 7–25)
CALCIUM SPEC-SCNC: 9.5 MG/DL (ref 8.6–10.5)
CHLORIDE SERPL-SCNC: 99 MMOL/L (ref 98–107)
CO2 SERPL-SCNC: 28 MMOL/L (ref 22–29)
CREAT SERPL-MCNC: 0.79 MG/DL (ref 0.57–1)
DEPRECATED RDW RBC AUTO: 43.4 FL (ref 37–54)
EOSINOPHIL # BLD AUTO: 0.34 10*3/MM3 (ref 0–0.4)
EOSINOPHIL NFR BLD AUTO: 2.9 % (ref 0.3–6.2)
ERYTHROCYTE [DISTWIDTH] IN BLOOD BY AUTOMATED COUNT: 13.4 % (ref 12.3–15.4)
GFR SERPL CREATININE-BSD FRML MDRD: 72 ML/MIN/1.73
GLOBULIN UR ELPH-MCNC: 3.4 GM/DL
GLUCOSE SERPL-MCNC: 128 MG/DL (ref 65–99)
HCT VFR BLD AUTO: 44.6 % (ref 34–46.6)
HGB BLD-MCNC: 15 G/DL (ref 12–15.9)
IMM GRANULOCYTES # BLD AUTO: 0.06 10*3/MM3 (ref 0–0.05)
IMM GRANULOCYTES NFR BLD AUTO: 0.5 % (ref 0–0.5)
LYMPHOCYTES # BLD AUTO: 3.74 10*3/MM3 (ref 0.7–3.1)
LYMPHOCYTES NFR BLD AUTO: 31.9 % (ref 19.6–45.3)
MCH RBC QN AUTO: 29.5 PG (ref 26.6–33)
MCHC RBC AUTO-ENTMCNC: 33.6 G/DL (ref 31.5–35.7)
MCV RBC AUTO: 87.8 FL (ref 79–97)
MONOCYTES # BLD AUTO: 0.79 10*3/MM3 (ref 0.1–0.9)
MONOCYTES NFR BLD AUTO: 6.7 % (ref 5–12)
NEUTROPHILS NFR BLD AUTO: 56.8 % (ref 42.7–76)
NEUTROPHILS NFR BLD AUTO: 6.64 10*3/MM3 (ref 1.7–7)
NRBC BLD AUTO-RTO: 0 /100 WBC (ref 0–0.2)
PLATELET # BLD AUTO: 397 10*3/MM3 (ref 140–450)
PMV BLD AUTO: 9.9 FL (ref 6–12)
POTASSIUM SERPL-SCNC: 3.3 MMOL/L (ref 3.5–5.2)
PROT SERPL-MCNC: 7.8 G/DL (ref 6–8.5)
QT INTERVAL: 416 MS
QTC INTERVAL: 468 MS
RBC # BLD AUTO: 5.08 10*6/MM3 (ref 3.77–5.28)
SODIUM SERPL-SCNC: 139 MMOL/L (ref 136–145)
TROPONIN T SERPL-MCNC: <0.01 NG/ML (ref 0–0.03)
WBC NRBC COR # BLD: 11.71 10*3/MM3 (ref 3.4–10.8)

## 2022-01-01 PROCEDURE — 70450 CT HEAD/BRAIN W/O DYE: CPT
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
## 2022-02-24 DIAGNOSIS — I10 ESSENTIAL HYPERTENSION: ICD-10-CM

## 2022-02-24 RX ORDER — HYDROCHLOROTHIAZIDE 25 MG/1
25 TABLET ORAL DAILY
Qty: 30 TABLET | Refills: 3 | Status: SHIPPED | OUTPATIENT
Start: 2022-02-24 | End: 2022-05-24 | Stop reason: SDUPTHER

## 2022-03-11 ENCOUNTER — HOSPITAL ENCOUNTER (OUTPATIENT)
Dept: CT IMAGING | Facility: HOSPITAL | Age: 72
End: 2022-03-11

## 2022-03-14 ENCOUNTER — HOSPITAL ENCOUNTER (OUTPATIENT)
Dept: CT IMAGING | Facility: HOSPITAL | Age: 72
Discharge: HOME | End: 2022-03-14
Admitting: INTERNAL MEDICINE

## 2022-03-14 ENCOUNTER — DOCUMENTATION (OUTPATIENT)
Dept: CT IMAGING | Facility: HOSPITAL | Age: 72
End: 2022-03-14

## 2022-03-14 DIAGNOSIS — Z87.891 PERSONAL HISTORY OF NICOTINE DEPENDENCE: ICD-10-CM

## 2022-03-14 PROCEDURE — 71271 CT THORAX LUNG CANCER SCR C-: CPT

## 2022-03-16 ENCOUNTER — DOCUMENTATION (OUTPATIENT)
Dept: CT IMAGING | Facility: HOSPITAL | Age: 72
End: 2022-03-16

## 2022-05-24 ENCOUNTER — LAB (OUTPATIENT)
Dept: LAB | Facility: HOSPITAL | Age: 72
End: 2022-05-24

## 2022-05-24 ENCOUNTER — OFFICE VISIT (OUTPATIENT)
Dept: INTERNAL MEDICINE | Facility: CLINIC | Age: 72
End: 2022-05-24

## 2022-05-24 ENCOUNTER — HOSPITAL ENCOUNTER (OUTPATIENT)
Dept: GENERAL RADIOLOGY | Facility: HOSPITAL | Age: 72
Discharge: HOME | End: 2022-05-24

## 2022-05-24 VITALS
HEIGHT: 59 IN | SYSTOLIC BLOOD PRESSURE: 130 MMHG | OXYGEN SATURATION: 98 % | WEIGHT: 148.5 LBS | DIASTOLIC BLOOD PRESSURE: 64 MMHG | TEMPERATURE: 97.6 F | RESPIRATION RATE: 16 BRPM | HEART RATE: 73 BPM | BODY MASS INDEX: 29.94 KG/M2

## 2022-05-24 DIAGNOSIS — R73.09 ELEVATED RANDOM BLOOD GLUCOSE LEVEL: ICD-10-CM

## 2022-05-24 DIAGNOSIS — M25.562 CHRONIC PAIN OF BOTH KNEES: ICD-10-CM

## 2022-05-24 DIAGNOSIS — R25.2 LEG CRAMPS: ICD-10-CM

## 2022-05-24 DIAGNOSIS — G89.29 CHRONIC PAIN OF BOTH KNEES: ICD-10-CM

## 2022-05-24 DIAGNOSIS — I10 ESSENTIAL HYPERTENSION: ICD-10-CM

## 2022-05-24 DIAGNOSIS — M25.561 CHRONIC PAIN OF BOTH KNEES: ICD-10-CM

## 2022-05-24 DIAGNOSIS — E78.2 MIXED HYPERLIPIDEMIA: ICD-10-CM

## 2022-05-24 DIAGNOSIS — E55.9 VITAMIN D DEFICIENCY: ICD-10-CM

## 2022-05-24 DIAGNOSIS — M85.852 OSTEOPENIA OF LEFT HIP: ICD-10-CM

## 2022-05-24 DIAGNOSIS — M25.512 ACUTE PAIN OF LEFT SHOULDER: Primary | ICD-10-CM

## 2022-05-24 DIAGNOSIS — M75.42 IMPINGEMENT SYNDROME OF LEFT SHOULDER: ICD-10-CM

## 2022-05-24 DIAGNOSIS — F17.210 CIGARETTE SMOKER: ICD-10-CM

## 2022-05-24 DIAGNOSIS — R42 DIZZINESS: ICD-10-CM

## 2022-05-24 DIAGNOSIS — M25.512 ACUTE PAIN OF LEFT SHOULDER: ICD-10-CM

## 2022-05-24 DIAGNOSIS — E66.09 CLASS 1 OBESITY DUE TO EXCESS CALORIES WITH SERIOUS COMORBIDITY AND BODY MASS INDEX (BMI) OF 30.0 TO 30.9 IN ADULT: ICD-10-CM

## 2022-05-24 LAB
25(OH)D3 SERPL-MCNC: 81.7 NG/ML (ref 30–100)
ALBUMIN SERPL-MCNC: 4.6 G/DL (ref 3.5–5.2)
ALBUMIN/GLOB SERPL: 1.4 G/DL
ALP SERPL-CCNC: 90 U/L (ref 39–117)
ALT SERPL W P-5'-P-CCNC: 13 U/L (ref 1–33)
ANION GAP SERPL CALCULATED.3IONS-SCNC: 12 MMOL/L (ref 5–15)
AST SERPL-CCNC: 18 U/L (ref 1–32)
BILIRUB SERPL-MCNC: 0.3 MG/DL (ref 0–1.2)
BUN SERPL-MCNC: 18 MG/DL (ref 8–23)
BUN/CREAT SERPL: 19.8 (ref 7–25)
CALCIUM SPEC-SCNC: 9.6 MG/DL (ref 8.6–10.5)
CHLORIDE SERPL-SCNC: 100 MMOL/L (ref 98–107)
CHOLEST SERPL-MCNC: 208 MG/DL (ref 0–200)
CO2 SERPL-SCNC: 28 MMOL/L (ref 22–29)
CREAT SERPL-MCNC: 0.91 MG/DL (ref 0.57–1)
DEPRECATED RDW RBC AUTO: 43.3 FL (ref 37–54)
EGFRCR SERPLBLD CKD-EPI 2021: 68.4 ML/MIN/1.73
ERYTHROCYTE [DISTWIDTH] IN BLOOD BY AUTOMATED COUNT: 13.3 % (ref 12.3–15.4)
GLOBULIN UR ELPH-MCNC: 3.2 GM/DL
GLUCOSE SERPL-MCNC: 112 MG/DL (ref 65–99)
HBA1C MFR BLD: 5.6 % (ref 4.8–5.6)
HCT VFR BLD AUTO: 42.3 % (ref 34–46.6)
HDLC SERPL-MCNC: 45 MG/DL (ref 40–60)
HGB BLD-MCNC: 14 G/DL (ref 12–15.9)
LDLC SERPL CALC-MCNC: 124 MG/DL (ref 0–100)
LDLC/HDLC SERPL: 2.63 {RATIO}
MAGNESIUM SERPL-MCNC: 2 MG/DL (ref 1.6–2.4)
MCH RBC QN AUTO: 29.2 PG (ref 26.6–33)
MCHC RBC AUTO-ENTMCNC: 33.1 G/DL (ref 31.5–35.7)
MCV RBC AUTO: 88.3 FL (ref 79–97)
PLATELET # BLD AUTO: 406 10*3/MM3 (ref 140–450)
PMV BLD AUTO: 10 FL (ref 6–12)
POTASSIUM SERPL-SCNC: 3.6 MMOL/L (ref 3.5–5.2)
PROT SERPL-MCNC: 7.8 G/DL (ref 6–8.5)
RBC # BLD AUTO: 4.79 10*6/MM3 (ref 3.77–5.28)
SODIUM SERPL-SCNC: 140 MMOL/L (ref 136–145)
TRIGL SERPL-MCNC: 224 MG/DL (ref 0–150)
VLDLC SERPL-MCNC: 39 MG/DL (ref 5–40)
WBC NRBC COR # BLD: 10.11 10*3/MM3 (ref 3.4–10.8)

## 2022-05-24 PROCEDURE — 1170F FXNL STATUS ASSESSED: CPT | Performed by: INTERNAL MEDICINE

## 2022-05-24 PROCEDURE — 83036 HEMOGLOBIN GLYCOSYLATED A1C: CPT

## 2022-05-24 PROCEDURE — 1125F AMNT PAIN NOTED PAIN PRSNT: CPT | Performed by: INTERNAL MEDICINE

## 2022-05-24 PROCEDURE — 80061 LIPID PANEL: CPT

## 2022-05-24 PROCEDURE — 85027 COMPLETE CBC AUTOMATED: CPT

## 2022-05-24 PROCEDURE — 36415 COLL VENOUS BLD VENIPUNCTURE: CPT

## 2022-05-24 PROCEDURE — 73030 X-RAY EXAM OF SHOULDER: CPT

## 2022-05-24 PROCEDURE — 1159F MED LIST DOCD IN RCRD: CPT | Performed by: INTERNAL MEDICINE

## 2022-05-24 PROCEDURE — G0439 PPPS, SUBSEQ VISIT: HCPCS | Performed by: INTERNAL MEDICINE

## 2022-05-24 PROCEDURE — 82306 VITAMIN D 25 HYDROXY: CPT

## 2022-05-24 PROCEDURE — 83735 ASSAY OF MAGNESIUM: CPT

## 2022-05-24 PROCEDURE — 80053 COMPREHEN METABOLIC PANEL: CPT

## 2022-05-24 PROCEDURE — 99214 OFFICE O/P EST MOD 30 MIN: CPT | Performed by: INTERNAL MEDICINE

## 2022-05-24 RX ORDER — HYDROCHLOROTHIAZIDE 25 MG/1
25 TABLET ORAL DAILY
Qty: 90 TABLET | Refills: 1 | Status: SHIPPED | OUTPATIENT
Start: 2022-05-24 | End: 2022-09-13 | Stop reason: SINTOL

## 2022-05-24 NOTE — PROGRESS NOTES
"CC: left shoulder pain    History:  Hillary Mann is a 71 y.o. female   She notes she has been having pain in her left shoulder that is centered about the lateral aspect and in the proximal upper arm. She has pain mostly with raising above her head.        ROS:  Review of Systems   Respiratory: Negative for shortness of breath.    Cardiovascular: Negative for chest pain.   Musculoskeletal: Positive for arthralgias and myalgias.        reports that she has been smoking. She started smoking about 52 years ago. She has a 89.25 pack-year smoking history. She has never used smokeless tobacco. She reports that she does not drink alcohol and does not use drugs.      Current Outpatient Medications:   •  amLODIPine (NORVASC) 10 MG tablet, Take 1 tablet by mouth Daily., Disp: 90 tablet, Rfl: 1  •  aspirin 81 MG chewable tablet, Chew 81 mg Daily., Disp: , Rfl:   •  Cholecalciferol (VITAMIN D) 2000 units capsule, Take 4,000 Units by mouth Daily., Disp: , Rfl:   •  cloNIDine (Catapres) 0.1 MG tablet, Take 1 tablet by mouth Daily As Needed for High Blood Pressure (BP >180/100)., Disp: 30 tablet, Rfl: 1  •  Diclofenac Sodium (VOLTAREN) 1 % gel gel, Apply 4 g topically to the appropriate area as directed 4 (Four) Times a Day As Needed (knee pain)., Disp: 350 g, Rfl: 1  •  hydroCHLOROthiazide (HYDRODIURIL) 25 MG tablet, Take 1 tablet by mouth Daily., Disp: 90 tablet, Rfl: 1  •  olmesartan (BENICAR) 40 MG tablet, Take 1 tablet by mouth Daily., Disp: 90 tablet, Rfl: 1  •  pravastatin (PRAVACHOL) 10 MG tablet, Take 1 tablet by mouth Daily. (Patient taking differently: Take 10 mg by mouth Daily. Mon Wed Friday), Disp: 90 tablet, Rfl: 3    OBJECTIVE:  /64 (BP Location: Left arm, Patient Position: Sitting, Cuff Size: Adult)   Pulse 73   Temp 97.6 °F (36.4 °C)   Resp 16   Ht 149.9 cm (59\")   Wt 67.4 kg (148 lb 8 oz)   SpO2 98%   Breastfeeding No   BMI 29.99 kg/m²    Physical Exam  Constitutional:       General: She is " not in acute distress.  Pulmonary:      Effort: Pulmonary effort is normal. No respiratory distress.   Musculoskeletal:      Comments: Positive Neer's and rosa on the left. Mild tenderness over AC joint on the left.    Neurological:      Mental Status: She is alert and oriented to person, place, and time.         Assessment/Plan     Diagnoses and all orders for this visit:    1. Acute pain of left shoulder (Primary)  2. Impingement syndrome of left shoulder  -     XR Shoulder 2+ View Left; Future  Roentgenogram of the shoulder to evaluate for dislocation, fracture or degenerative changes. Recommend stretches, which are given with AVS. Consider further imaging and/or PT if not improving. Recommend conservative therapy with NSAID and ice/heat.     3. Leg cramps  -     Magnesium; Future    4. Essential hypertension  -     hydroCHLOROthiazide (HYDRODIURIL) 25 MG tablet; Take 1 tablet by mouth Daily.  Dispense: 90 tablet; Refill: 1  -     Comprehensive Metabolic Panel; Future  -     CBC (No Diff); Future  Well controlled, BP goal for age is <140/90 per JNC 8 guidelines and continue current medications    5. Chronic pain of both knees  Continue exercise as tolerated with NSAID gel.     6. Osteopenia of left hip  -     DEXA Bone Density Axial; Future  DEXA surveillance.     7. Class 1 obesity due to excess calories with serious comorbidity and body mass index (BMI) of 30.0 to 30.9 in adult  BMI 30. Recommend ongoing focus on exercise and diet for the purpose of weight management.     8. Mixed hyperlipidemia  -     Lipid Panel; Future  Stable on moderate intensity statin therapy per ACC/AHA guidelines.    9. Cigarette smoker  Patient was counseled on and understood the many dangers of continuing to use tobacco. Despite this, Ms. Mann states quitting is not an immediate priority at this time. I reminded the patient that if quitting becomes an increased priority to contact us for help with quitting including  pharmacologic & nonpharmacologic options or any additional resources.    10. Elevated random blood glucose level  -     Hemoglobin A1c; Future    11. Vitamin D deficiency  -     Vitamin D 25 Hydroxy; Future    12. Dizziness  -     US Carotid Bilateral; Future  She has symptoms and previous US was <50% stenosis. Surveillance study ordered.     An After Visit Summary was printed and given to the patient at discharge.  Return in about 6 months (around 11/24/2022) for Recheck.          Christopher Jo D.O. 5/25/2022   Electronically signed.

## 2022-05-24 NOTE — PROGRESS NOTES
The ABCs of the Annual Wellness Visit  Subsequent Medicare Wellness Visit    No chief complaint on file.   See  note  Subjective    History of Present Illness:  Hillary Mann is a 71 y.o. female who presents for a Subsequent Medicare Wellness Visit.    The following portions of the patient's history were reviewed and   updated as appropriate: allergies, current medications, past family history, past medical history, past social history, past surgical history and problem list.    Compared to one year ago, the patient feels her physical   health is the same.    Compared to one year ago, the patient feels her mental   health is better.    Recent Hospitalizations:  She was not admitted to the hospital during the last year.       Current Medical Providers:  Patient Care Team:  Christopher Jo DO as PCP - General (Internal Medicine)  LORNE Martin MD as Consulting Physician (Orthopedic Surgery)    Outpatient Medications Prior to Visit   Medication Sig Dispense Refill   • amLODIPine (NORVASC) 10 MG tablet Take 1 tablet by mouth Daily. 90 tablet 1   • aspirin 81 MG chewable tablet Chew 81 mg Daily.     • Cholecalciferol (VITAMIN D) 2000 units capsule Take 4,000 Units by mouth Daily.     • cloNIDine (Catapres) 0.1 MG tablet Take 1 tablet by mouth Daily As Needed for High Blood Pressure (BP >180/100). 30 tablet 1   • Diclofenac Sodium (VOLTAREN) 1 % gel gel Apply 4 g topically to the appropriate area as directed 4 (Four) Times a Day As Needed (knee pain). 350 g 1   • olmesartan (BENICAR) 40 MG tablet Take 1 tablet by mouth Daily. 90 tablet 1   • pravastatin (PRAVACHOL) 10 MG tablet Take 1 tablet by mouth Daily. (Patient taking differently: Take 10 mg by mouth Daily. Mon Wed Friday) 90 tablet 3   • hydroCHLOROthiazide (HYDRODIURIL) 25 MG tablet Take 1 tablet by mouth Daily. 30 tablet 3   • tiZANidine (ZANAFLEX) 2 MG tablet Take 1-2 tablets by mouth At Night As Needed for Muscle Spasms. 60 tablet 3  "    No facility-administered medications prior to visit.       No opioid medication identified on active medication list. I have reviewed chart for other potential  high risk medication/s and harmful drug interactions in the elderly.          Aspirin is on active medication list. Aspirin use is indicated based on review of current medical condition/s. Pros and cons of this therapy have been discussed today. Benefits of this medication outweigh potential harm.  Patient has been encouraged to continue taking this medication.  .      Patient Active Problem List   Diagnosis   • Essential hypertension   • Class 1 obesity due to excess calories with serious comorbidity and body mass index (BMI) of 30.0 to 30.9 in adult   • Vitamin D deficiency   • Cigarette smoker   • Mild mitral regurgitation   • Osteopenia of left hip   • Urge incontinence of urine   • Mixed hyperlipidemia     Advance Care Planning  Advance Directive is not on file.  ACP discussion was held with the patient during this visit. Patient does not have an advance directive, declines further assistance.          Objective    Vitals:    05/24/22 1105   BP: 130/64   BP Location: Left arm   Patient Position: Sitting   Cuff Size: Adult   Pulse: 73   Resp: 16   Temp: 97.6 °F (36.4 °C)   SpO2: 98%   Weight: 67.4 kg (148 lb 8 oz)   Height: 149.9 cm (59\")     Body mass index is 29.99 kg/m².  BMI is >= 25 and < 30. (Overweight) The following options were offered after discussion: exercise counseling/recommendations and nutrition counseling/recommendations    Does the patient have evidence of cognitive impairment? No    Physical Exam  Lab Results   Component Value Date    TRIG 224 (H) 05/24/2022    HDL 45 05/24/2022     (H) 05/24/2022    VLDL 39 05/24/2022    HGBA1C 5.60 05/24/2022            HEALTH RISK ASSESSMENT    Smoking Status:  Social History     Tobacco Use   Smoking Status Current Every Day Smoker   • Packs/day: 1.75   • Years: 51.00   • Pack years: " 89.25   • Start date: 1970   Smokeless Tobacco Never Used   Tobacco Comment    down to 1/2 pack a day     Alcohol Consumption:  Social History     Substance and Sexual Activity   Alcohol Use No     Fall Risk Screen:    AKILADI Fall Risk Assessment was completed, and patient is at LOW risk for falls.Assessment completed on:5/24/2022    Depression Screening:  PHQ-2/PHQ-9 Depression Screening 5/24/2022   Retired PHQ-9 Total Score -   Retired Total Score -   Little Interest or Pleasure in Doing Things 0-->not at all   Feeling Down, Depressed or Hopeless 0-->not at all   PHQ-9: Brief Depression Severity Measure Score 0       Health Habits and Functional and Cognitive Screening:  Functional & Cognitive Status 5/24/2022   Do you have difficulty preparing food and eating? No   Do you have difficulty bathing yourself, getting dressed or grooming yourself? No   Do you have difficulty using the toilet? No   Do you have difficulty moving around from place to place? No   Do you have trouble with steps or getting out of a bed or a chair? No   Current Diet Unhealthy Diet   Dental Exam Up to date   Eye Exam Not up to date   Exercise (times per week) 0 times per week   Current Exercises Include No Regular Exercise   Current Exercise Activities Include -   Do you need help using the phone?  No   Are you deaf or do you have serious difficulty hearing?  No   Do you need help with transportation? No   Do you need help shopping? No   Do you need help preparing meals?  No   Do you need help with housework?  No   Do you need help with laundry? No   Do you need help taking your medications? No   Do you need help managing money? No   Do you ever drive or ride in a car without wearing a seat belt? No   Have you felt unusual stress, anger or loneliness in the last month? No   Who do you live with? Spouse   If you need help, do you have trouble finding someone available to you? No   Have you been bothered in the last four weeks by sexual  problems? No   Do you have difficulty concentrating, remembering or making decisions? No       Age-appropriate Screening Schedule:  Refer to the list below for future screening recommendations based on patient's age, sex and/or medical conditions. Orders for these recommended tests are listed in the plan section. The patient has been provided with a written plan.    Health Maintenance   Topic Date Due   • TDAP/TD VACCINES (1 - Tdap) Never done   • ZOSTER VACCINE (1 of 2) Never done   • DXA SCAN  12/24/2021   • INFLUENZA VACCINE  08/01/2022   • LIPID PANEL  05/24/2023   • MAMMOGRAM  12/29/2023              Assessment & Plan   CMS Preventative Services Quick Reference  Risk Factors Identified During Encounter  Cardiovascular Disease  Depression/Dysphoria  Immunizations Discussed/Encouraged (specific Immunizations; Tdap, Shingrix and COVID19  Inactivity/Sedentary  Obesity/Overweight   The above risks/problems have been discussed with the patient.  Follow up actions/plans if indicated are seen below in the Assessment/Plan Section.  Pertinent information has been shared with the patient in the After Visit Summary.    Diagnoses and all orders for this visit:    1. Acute pain of left shoulder (Primary)  -     XR Shoulder 2+ View Left; Future    2. Impingement syndrome of left shoulder    3. Leg cramps  -     Magnesium; Future    4. Essential hypertension  -     hydroCHLOROthiazide (HYDRODIURIL) 25 MG tablet; Take 1 tablet by mouth Daily.  Dispense: 90 tablet; Refill: 1  -     Comprehensive Metabolic Panel; Future  -     CBC (No Diff); Future    5. Chronic pain of both knees    6. Osteopenia of left hip  -     DEXA Bone Density Axial; Future    7. Class 1 obesity due to excess calories with serious comorbidity and body mass index (BMI) of 30.0 to 30.9 in adult    8. Mixed hyperlipidemia  -     Lipid Panel; Future    9. Cigarette smoker    10. Elevated random blood glucose level  -     Hemoglobin A1c; Future    11. Vitamin D  deficiency  -     Vitamin D 25 Hydroxy; Future    12. Dizziness  -     US Carotid Bilateral; Future        Follow Up:   Return in about 6 months (around 11/24/2022) for Recheck.     An After Visit Summary and PPPS were made available to the patient.

## 2022-06-08 ENCOUNTER — HOSPITAL ENCOUNTER (OUTPATIENT)
Dept: BONE DENSITY | Facility: HOSPITAL | Age: 72
Discharge: HOME | End: 2022-06-08

## 2022-06-08 ENCOUNTER — HOSPITAL ENCOUNTER (OUTPATIENT)
Dept: ULTRASOUND IMAGING | Facility: HOSPITAL | Age: 72
Discharge: HOME | End: 2022-06-08

## 2022-06-08 DIAGNOSIS — M85.852 OSTEOPENIA OF LEFT HIP: ICD-10-CM

## 2022-06-08 DIAGNOSIS — R42 DIZZINESS: ICD-10-CM

## 2022-06-08 PROCEDURE — 77080 DXA BONE DENSITY AXIAL: CPT

## 2022-06-08 PROCEDURE — 93880 EXTRACRANIAL BILAT STUDY: CPT | Performed by: SURGERY

## 2022-06-08 PROCEDURE — 93880 EXTRACRANIAL BILAT STUDY: CPT

## 2022-07-02 DIAGNOSIS — I10 ESSENTIAL HYPERTENSION: ICD-10-CM

## 2022-07-04 RX ORDER — AMLODIPINE BESYLATE 10 MG/1
TABLET ORAL
Qty: 90 TABLET | Refills: 3 | Status: SHIPPED | OUTPATIENT
Start: 2022-07-04

## 2022-07-05 DIAGNOSIS — I10 ESSENTIAL HYPERTENSION: ICD-10-CM

## 2022-07-05 RX ORDER — OLMESARTAN MEDOXOMIL 40 MG/1
40 TABLET ORAL DAILY
Qty: 90 TABLET | Refills: 1 | Status: SHIPPED | OUTPATIENT
Start: 2022-07-05 | End: 2023-01-09

## 2022-08-15 ENCOUNTER — TELEPHONE (OUTPATIENT)
Dept: INTERNAL MEDICINE | Facility: CLINIC | Age: 72
End: 2022-08-15

## 2022-08-15 NOTE — TELEPHONE ENCOUNTER
These are still just cramps or are there features of legs jumping or moving on their own?     If still just cramps, there are not any medications that are proven to work. A very small amount of tonic water could be tried (no more than a shot glass) before bed to assist. Even this is not a proven therapy. Increasing hydration, exercise and avoiding caffeine and alcohol is recommended. Massage can be helpful, especially before bed.

## 2022-08-15 NOTE — TELEPHONE ENCOUNTER
Patient called she states that she is still having problems with her legs, she states that she has tried tizanidine, voltern gel, and nothing is helping was wondering if there is something else she can try she is not sleeping at all  she finally goes to sleep around 3-4 am and is exhausted.

## 2022-08-16 ENCOUNTER — TELEPHONE (OUTPATIENT)
Dept: INTERNAL MEDICINE | Facility: CLINIC | Age: 72
End: 2022-08-16

## 2022-08-16 NOTE — TELEPHONE ENCOUNTER
Please see previous notes, patient called she states that she had a horrible night last night with her legs, she is wanting something to help her sleep or something to help her legs stop from hurting.

## 2022-08-16 NOTE — TELEPHONE ENCOUNTER
Spoke to David and she relayed to the patient. Stop statin x 2 weeks. She may try tonic water as below carefully. Regular exercise in the evening as well as nightly massage recommended.

## 2022-09-13 ENCOUNTER — OFFICE VISIT (OUTPATIENT)
Dept: INTERNAL MEDICINE | Facility: CLINIC | Age: 72
End: 2022-09-13

## 2022-09-13 VITALS
DIASTOLIC BLOOD PRESSURE: 70 MMHG | BODY MASS INDEX: 29.73 KG/M2 | SYSTOLIC BLOOD PRESSURE: 112 MMHG | TEMPERATURE: 98 F | HEART RATE: 76 BPM | WEIGHT: 147.5 LBS | OXYGEN SATURATION: 98 % | HEIGHT: 59 IN | RESPIRATION RATE: 16 BRPM

## 2022-09-13 DIAGNOSIS — R09.89 OTHER SPECIFIED SYMPTOMS AND SIGNS INVOLVING THE CIRCULATORY AND RESPIRATORY SYSTEMS: ICD-10-CM

## 2022-09-13 DIAGNOSIS — E66.3 OVERWEIGHT (BMI 25.0-29.9): ICD-10-CM

## 2022-09-13 DIAGNOSIS — F17.210 CIGARETTE SMOKER: ICD-10-CM

## 2022-09-13 DIAGNOSIS — I10 ESSENTIAL HYPERTENSION: ICD-10-CM

## 2022-09-13 DIAGNOSIS — G47.62 NOCTURNAL LEG CRAMPS: Primary | ICD-10-CM

## 2022-09-13 DIAGNOSIS — I83.813 VARICOSE VEINS OF BOTH LOWER EXTREMITIES WITH PAIN: ICD-10-CM

## 2022-09-13 PROCEDURE — 99214 OFFICE O/P EST MOD 30 MIN: CPT | Performed by: INTERNAL MEDICINE

## 2022-09-13 RX ORDER — POTASSIUM &MAGNESIUM ASPARTATE 250-250 MG
1 CAPSULE ORAL 2 TIMES DAILY
COMMUNITY

## 2022-09-13 NOTE — PROGRESS NOTES
CC: leg pain    History:  Hillary Mann is a 72 y.o. female   She presents with pain in her lower extremities. She states the pain has been going on for several months, but has intensified in the last 3 weeks. She describes the pain as an ache in the back of her calves. She states the pain is worse at night while resting. She discontinued her statin 3 weeks ago, but her symptoms did not improve. She has also tried tonic water. She denies pain with movement or swelling in her lower extremities. She reports walking and consuming foods high in magnesium provides some relief.        ROS:  Review of Systems   Constitutional: Positive for diaphoresis and fatigue. Negative for activity change and appetite change.   Respiratory: Negative for cough, chest tightness and shortness of breath.    Cardiovascular: Positive for leg swelling. Negative for chest pain and palpitations.   Gastrointestinal: Negative for abdominal distention, abdominal pain, constipation and diarrhea.   Musculoskeletal: Positive for gait problem and myalgias. Negative for back pain.   Neurological: Negative for dizziness, light-headedness and headaches.        reports that she has been smoking. She started smoking about 52 years ago. She has a 89.25 pack-year smoking history. She has never used smokeless tobacco. She reports that she does not drink alcohol and does not use drugs.      Current Outpatient Medications:   •  amLODIPine (NORVASC) 10 MG tablet, Take 1 tablet by mouth once daily, Disp: 90 tablet, Rfl: 3  •  aspirin 81 MG chewable tablet, Chew 81 mg Daily., Disp: , Rfl:   •  Cholecalciferol (VITAMIN D) 2000 units capsule, Take 4,000 Units by mouth Daily., Disp: , Rfl:   •  cloNIDine (Catapres) 0.1 MG tablet, Take 1 tablet by mouth Daily As Needed for High Blood Pressure (BP >180/100)., Disp: 30 tablet, Rfl: 1  •  Diclofenac Sodium (VOLTAREN) 1 % gel gel, Apply 4 g topically to the appropriate area as directed 4 (Four) Times a Day As Needed  "(knee pain)., Disp: 350 g, Rfl: 1  •  Mag Aspart-Potassium Aspart (Potassium & Magnesium Aspartat) 250-250 MG capsule, Take 1 tablet by mouth 2 (Two) Times a Day., Disp: , Rfl:   •  olmesartan (BENICAR) 40 MG tablet, Take 1 tablet by mouth Daily., Disp: 90 tablet, Rfl: 1  •  pravastatin (PRAVACHOL) 10 MG tablet, Take 1 tablet by mouth Daily., Disp: 90 tablet, Rfl: 3  HCTZ 25mg daily    OBJECTIVE:  /70 (BP Location: Left arm, Patient Position: Sitting, Cuff Size: Adult)   Pulse 76   Temp 98 °F (36.7 °C)   Resp 16   Ht 149.9 cm (59\")   Wt 66.9 kg (147 lb 8 oz)   SpO2 98%   Breastfeeding No   BMI 29.79 kg/m²    Physical Exam  Constitutional:       Appearance: She is overweight.   Cardiovascular:      Rate and Rhythm: Normal rate and regular rhythm.      Pulses: Normal pulses.      Heart sounds: Normal heart sounds.   Pulmonary:      Effort: Pulmonary effort is normal.      Breath sounds: Normal breath sounds.   Abdominal:      General: Bowel sounds are normal.   Musculoskeletal:      Right upper leg: Tenderness present.      Left upper leg: Tenderness present.      Right lower leg: Tenderness present.      Left lower leg: Tenderness present.   Neurological:      Mental Status: She is alert.         Assessment/Plan     Diagnoses and all orders for this visit:    1. Nocturnal leg cramps (Primary)  3. Varicose veins of both lower extremities with pain  We will stop HCTZ to see if electrolyte shifts could be contributory. Recommend wearing compression socks as it does seem she has some tender varicosities that arise in the evening. Restart statin since stopping it made no difference. Recommend heat, ongoing exercise and consider ABIs and/or VVI study if not improving.     4. Essential hypertension  Well controlled, BP goal for age is <140/90 per JNC 8 guidelines and hold HCTZ for now.     5. Cigarette smoker  Patient was counseled on and understood the many dangers of continuing to use tobacco. Despite this, " Ms. Mann states quitting is not an immediate priority at this time. I reminded the patient that if quitting becomes an increased priority to contact us for help with quitting including pharmacologic & nonpharmacologic options or any additional resources.    6. Overweight (BMI 25.0-29.9)  BMI is >= 25 and <30. (Overweight) The following options were offered after discussion;: exercise counseling/recommendations and nutrition counseling/recommendations    Some portions of this note including history and physical were obtained by a medical student. However, the full history, ROS, physical exam and plan were discussed and reviewed with the student and patient. Physical exam is my own. All elements of this note are reviewed and represent solely my assessment and plan.    An After Visit Summary was printed and given to the patient at discharge.  Return for Next scheduled follow up.  {Review (Popup)  Instructions  Quality  Charge Capture  LOS  Follow-up  Communications :23}        Christopher Jo D.O. 9/13/2022   Electronically signed.

## 2022-09-23 ENCOUNTER — TELEPHONE (OUTPATIENT)
Dept: INTERNAL MEDICINE | Facility: CLINIC | Age: 72
End: 2022-09-23

## 2022-09-23 DIAGNOSIS — G47.62 NOCTURNAL LEG CRAMPS: Primary | ICD-10-CM

## 2022-09-23 DIAGNOSIS — I83.813 VARICOSE VEINS OF BOTH LOWER EXTREMITIES WITH PAIN: ICD-10-CM

## 2022-09-23 DIAGNOSIS — R09.89 OTHER SPECIFIED SYMPTOMS AND SIGNS INVOLVING THE CIRCULATORY AND RESPIRATORY SYSTEMS: ICD-10-CM

## 2022-09-23 NOTE — TELEPHONE ENCOUNTER
Patient called she states that her legs are not getting better. She was wanting to move forward to what you all discussed at her last office visit.

## 2022-09-23 NOTE — TELEPHONE ENCOUNTER
I have ordered some tests and she will be contacted to be scheduled. She should continue wearing compression socks daily.

## 2022-09-30 DIAGNOSIS — M79.605 LOWER EXTREMITY PAIN, BILATERAL: ICD-10-CM

## 2022-09-30 DIAGNOSIS — M79.604 LOWER EXTREMITY PAIN, BILATERAL: ICD-10-CM

## 2022-09-30 DIAGNOSIS — G47.62 NOCTURNAL LEG CRAMPS: Primary | ICD-10-CM

## 2022-09-30 DIAGNOSIS — R25.2 LEG CRAMPS: ICD-10-CM

## 2022-10-10 ENCOUNTER — HOSPITAL ENCOUNTER (OUTPATIENT)
Dept: ULTRASOUND IMAGING | Facility: HOSPITAL | Age: 72
Discharge: HOME | End: 2022-10-10

## 2022-10-10 DIAGNOSIS — I83.813 VARICOSE VEINS OF BOTH LOWER EXTREMITIES WITH PAIN: ICD-10-CM

## 2022-10-10 DIAGNOSIS — G47.62 NOCTURNAL LEG CRAMPS: ICD-10-CM

## 2022-10-10 DIAGNOSIS — M79.604 LOWER EXTREMITY PAIN, BILATERAL: ICD-10-CM

## 2022-10-10 DIAGNOSIS — M79.605 LOWER EXTREMITY PAIN, BILATERAL: ICD-10-CM

## 2022-10-10 DIAGNOSIS — R09.89 OTHER SPECIFIED SYMPTOMS AND SIGNS INVOLVING THE CIRCULATORY AND RESPIRATORY SYSTEMS: ICD-10-CM

## 2022-10-10 PROCEDURE — 93923 UPR/LXTR ART STDY 3+ LVLS: CPT

## 2022-10-10 PROCEDURE — 93970 EXTREMITY STUDY: CPT

## 2022-10-13 DIAGNOSIS — M79.605 LOWER EXTREMITY PAIN, BILATERAL: ICD-10-CM

## 2022-10-13 DIAGNOSIS — G47.62 NOCTURNAL LEG CRAMPS: Primary | ICD-10-CM

## 2022-10-13 DIAGNOSIS — I83.813 VARICOSE VEINS OF BOTH LOWER EXTREMITIES WITH PAIN: ICD-10-CM

## 2022-10-13 DIAGNOSIS — M79.604 LOWER EXTREMITY PAIN, BILATERAL: ICD-10-CM

## 2022-10-13 DIAGNOSIS — R25.2 LEG CRAMPS: ICD-10-CM

## 2022-10-14 DIAGNOSIS — M79.605 LOWER EXTREMITY PAIN, BILATERAL: ICD-10-CM

## 2022-10-14 DIAGNOSIS — I83.813 VARICOSE VEINS OF BOTH LOWER EXTREMITIES WITH PAIN: ICD-10-CM

## 2022-10-14 DIAGNOSIS — M79.604 LOWER EXTREMITY PAIN, BILATERAL: ICD-10-CM

## 2022-10-14 DIAGNOSIS — G47.62 NOCTURNAL LEG CRAMPS: Primary | ICD-10-CM

## 2022-10-25 ENCOUNTER — APPOINTMENT (OUTPATIENT)
Dept: ULTRASOUND IMAGING | Facility: HOSPITAL | Age: 72
End: 2022-10-25

## 2022-10-31 ENCOUNTER — HOSPITAL ENCOUNTER (OUTPATIENT)
Dept: ULTRASOUND IMAGING | Facility: HOSPITAL | Age: 72
Discharge: HOME | End: 2022-10-31
Admitting: INTERNAL MEDICINE

## 2022-10-31 DIAGNOSIS — I83.813 VARICOSE VEINS OF BOTH LOWER EXTREMITIES WITH PAIN: ICD-10-CM

## 2022-10-31 DIAGNOSIS — M79.604 LOWER EXTREMITY PAIN, BILATERAL: ICD-10-CM

## 2022-10-31 DIAGNOSIS — M79.605 LOWER EXTREMITY PAIN, BILATERAL: ICD-10-CM

## 2022-10-31 DIAGNOSIS — G47.62 NOCTURNAL LEG CRAMPS: ICD-10-CM

## 2022-10-31 PROCEDURE — 93970 EXTREMITY STUDY: CPT

## 2022-11-01 ENCOUNTER — TELEPHONE (OUTPATIENT)
Dept: INTERNAL MEDICINE | Facility: CLINIC | Age: 72
End: 2022-11-01

## 2022-11-01 NOTE — TELEPHONE ENCOUNTER
----- Message from Christopher Jo DO sent at 11/1/2022 10:03 AM CDT -----  Reflux is blood moving down back through the vein instead of up toward the heart.This is somewhat normal and expected as we age, but it is very mild in her case.     Paying attention to anything that is different on the days that it hurts vs doesn't hurt is definitely advised. Hoepfully we can created more good days than bad.

## 2022-11-01 NOTE — TELEPHONE ENCOUNTER
----- Message from Christopher Jo DO sent at 10/31/2022  4:31 PM CDT -----  We see that there is some very mild reflux in one area of the left leg, but this is not something that is likely to be causing the bulk of her symptoms. How are symptoms doing?

## 2022-11-01 NOTE — TELEPHONE ENCOUNTER
Patient was informed, voice understanding, she states that she will keep a journal & was wondering if it was okay to take Vit E ?

## 2022-11-01 NOTE — TELEPHONE ENCOUNTER
Vitamin E is not something we routinely recommend and it can have concerns with increased risk of bleeding if taking too much. I am not opposed to trying it to see if it helps with her legs, but if it is not improving things significantly, I would recommend trying to stop it. If things are not significantly better on it, then no need to continue.

## 2022-12-05 ENCOUNTER — LAB (OUTPATIENT)
Dept: LAB | Facility: HOSPITAL | Age: 72
End: 2022-12-05

## 2022-12-05 ENCOUNTER — OFFICE VISIT (OUTPATIENT)
Dept: INTERNAL MEDICINE | Facility: CLINIC | Age: 72
End: 2022-12-05

## 2022-12-05 VITALS
HEIGHT: 59 IN | OXYGEN SATURATION: 94 % | HEART RATE: 90 BPM | BODY MASS INDEX: 29.73 KG/M2 | RESPIRATION RATE: 16 BRPM | WEIGHT: 147.5 LBS | DIASTOLIC BLOOD PRESSURE: 80 MMHG | SYSTOLIC BLOOD PRESSURE: 124 MMHG | TEMPERATURE: 98 F

## 2022-12-05 DIAGNOSIS — I10 ESSENTIAL HYPERTENSION: ICD-10-CM

## 2022-12-05 DIAGNOSIS — Z87.891 PERSONAL HISTORY OF NICOTINE DEPENDENCE: ICD-10-CM

## 2022-12-05 DIAGNOSIS — G47.62 NOCTURNAL LEG CRAMPS: ICD-10-CM

## 2022-12-05 DIAGNOSIS — G47.62 NOCTURNAL LEG CRAMPS: Primary | ICD-10-CM

## 2022-12-05 DIAGNOSIS — E66.3 OVERWEIGHT (BMI 25.0-29.9): ICD-10-CM

## 2022-12-05 DIAGNOSIS — F17.210 CIGARETTE SMOKER: ICD-10-CM

## 2022-12-05 DIAGNOSIS — Z23 NEED FOR VACCINATION: ICD-10-CM

## 2022-12-05 LAB
ANION GAP SERPL CALCULATED.3IONS-SCNC: 9.8 MMOL/L (ref 5–15)
BUN SERPL-MCNC: 13 MG/DL (ref 8–23)
BUN/CREAT SERPL: 16.5 (ref 7–25)
CALCIUM SPEC-SCNC: 9.4 MG/DL (ref 8.6–10.5)
CHLORIDE SERPL-SCNC: 105 MMOL/L (ref 98–107)
CK SERPL-CCNC: 44 U/L (ref 20–180)
CO2 SERPL-SCNC: 28.2 MMOL/L (ref 22–29)
CREAT SERPL-MCNC: 0.79 MG/DL (ref 0.57–1)
EGFRCR SERPLBLD CKD-EPI 2021: 80.6 ML/MIN/1.73
GLUCOSE SERPL-MCNC: 90 MG/DL (ref 65–99)
MAGNESIUM SERPL-MCNC: 2.3 MG/DL (ref 1.6–2.4)
POTASSIUM SERPL-SCNC: 3.5 MMOL/L (ref 3.5–5.2)
SODIUM SERPL-SCNC: 143 MMOL/L (ref 136–145)
VIT B12 BLD-MCNC: 325 PG/ML (ref 211–946)

## 2022-12-05 PROCEDURE — 83735 ASSAY OF MAGNESIUM: CPT

## 2022-12-05 PROCEDURE — 90686 IIV4 VACC NO PRSV 0.5 ML IM: CPT | Performed by: INTERNAL MEDICINE

## 2022-12-05 PROCEDURE — 36415 COLL VENOUS BLD VENIPUNCTURE: CPT

## 2022-12-05 PROCEDURE — 82550 ASSAY OF CK (CPK): CPT

## 2022-12-05 PROCEDURE — G0008 ADMIN INFLUENZA VIRUS VAC: HCPCS | Performed by: INTERNAL MEDICINE

## 2022-12-05 PROCEDURE — 82607 VITAMIN B-12: CPT

## 2022-12-05 PROCEDURE — 80048 BASIC METABOLIC PNL TOTAL CA: CPT

## 2022-12-05 PROCEDURE — 99214 OFFICE O/P EST MOD 30 MIN: CPT | Performed by: INTERNAL MEDICINE

## 2022-12-05 NOTE — PROGRESS NOTES
"CC: f/u leg cramps    History:  Hillary Mann is a 72 y.o. female   She notes leg cramps have continued to be an issue for her, especially at night. She wonders if exercise on a treadmill could be helpful.        ROS:  Review of Systems   Respiratory: Negative for shortness of breath.    Cardiovascular: Negative for chest pain.   Musculoskeletal: Positive for myalgias.        reports that she has been smoking cigarettes. She started smoking about 52 years ago. She has a 89.25 pack-year smoking history. She has never used smokeless tobacco. She reports that she does not drink alcohol and does not use drugs.      Current Outpatient Medications:   •  amLODIPine (NORVASC) 10 MG tablet, Take 1 tablet by mouth once daily, Disp: 90 tablet, Rfl: 3  •  aspirin 81 MG chewable tablet, Chew 81 mg Daily., Disp: , Rfl:   •  Cholecalciferol (VITAMIN D) 2000 units capsule, Take 4,000 Units by mouth Daily., Disp: , Rfl:   •  Diclofenac Sodium (VOLTAREN) 1 % gel gel, Apply 4 g topically to the appropriate area as directed 4 (Four) Times a Day As Needed (knee pain)., Disp: 350 g, Rfl: 1  •  Mag Aspart-Potassium Aspart (Potassium & Magnesium Aspartat) 250-250 MG capsule, Take 1 tablet by mouth 2 (Two) Times a Day., Disp: , Rfl:   •  olmesartan (BENICAR) 40 MG tablet, Take 1 tablet by mouth Daily., Disp: 90 tablet, Rfl: 1  •  pravastatin (PRAVACHOL) 10 MG tablet, Take 1 tablet by mouth Daily., Disp: 90 tablet, Rfl: 3  •  cloNIDine (Catapres) 0.1 MG tablet, Take 1 tablet by mouth Daily As Needed for High Blood Pressure (BP >180/100)., Disp: 30 tablet, Rfl: 1    OBJECTIVE:  /80 (BP Location: Left arm, Patient Position: Sitting, Cuff Size: Adult)   Pulse 90   Temp 98 °F (36.7 °C)   Resp 16   Ht 149.9 cm (59\")   Wt 66.9 kg (147 lb 8 oz)   SpO2 94%   BMI 29.79 kg/m²    Physical Exam  Constitutional:       General: She is not in acute distress.  Cardiovascular:      Rate and Rhythm: Normal rate and regular rhythm.      Heart " sounds: Normal heart sounds. No murmur heard.  Pulmonary:      Effort: Pulmonary effort is normal.      Breath sounds: Normal breath sounds. No wheezing.   Neurological:      Mental Status: She is alert and oriented to person, place, and time.      Gait: Gait normal.   Psychiatric:         Mood and Affect: Mood normal.         Behavior: Behavior normal.         Assessment/Plan     Diagnoses and all orders for this visit:    1. Nocturnal leg cramps (Primary)  -     Basic metabolic panel; Future  -     CK; Future  -     Magnesium; Future  -     Vitamin B12; Future  Labs for monitoring, but recommend regular exercise. BETHANIE and VVI study unrevealing. Statin holiday did not affect symptoms. Consider workup for MSK causes in the setting of known degenerative lumbar changes.     2. Personal history of nicotine dependence  -     CT Chest Low Dose Wo; Future  Lung cancer screening in March.     3. Essential hypertension  Well controlled, BP goal for age is <140/90 per JNC 8 guidelines and continue current medications    4. Cigarette smoker  Patient was counseled on and understood the many dangers of continuing to use tobacco. Despite this, Ms. Mann states quitting is not an immediate priority at this time. I reminded the patient that if quitting becomes an increased priority to contact us for help with quitting including pharmacologic & nonpharmacologic options or any additional resources.    5. Overweight (BMI 25.0-29.9)  BMI is >= 25 and <30. (Overweight) The following options were offered after discussion;: exercise counseling/recommendations and nutrition counseling/recommendations.    6. Need for vaccination  -     FluLaval/Fluzone >6 mos ([id number])    An After Visit Summary was printed and given to the patient at discharge.  Return in about 6 months (around 6/5/2023) for Recheck.          Christopher Jo D.O. 12/5/2022   Electronically signed.

## 2023-01-09 DIAGNOSIS — I10 ESSENTIAL HYPERTENSION: ICD-10-CM

## 2023-01-09 RX ORDER — OLMESARTAN MEDOXOMIL 40 MG/1
40 TABLET ORAL DAILY
Qty: 90 TABLET | Refills: 1 | Status: SHIPPED | OUTPATIENT
Start: 2023-01-09

## 2023-01-16 DIAGNOSIS — E78.2 MIXED HYPERLIPIDEMIA: ICD-10-CM

## 2023-01-16 RX ORDER — PRAVASTATIN SODIUM 10 MG
10 TABLET ORAL DAILY
Qty: 90 TABLET | Refills: 3 | Status: SHIPPED | OUTPATIENT
Start: 2023-01-16

## 2023-03-15 ENCOUNTER — TELEPHONE (OUTPATIENT)
Dept: INTERNAL MEDICINE | Facility: CLINIC | Age: 73
End: 2023-03-15
Payer: MEDICARE

## 2023-03-15 DIAGNOSIS — F17.210 CIGARETTE SMOKER: Primary | ICD-10-CM

## 2023-03-15 NOTE — TELEPHONE ENCOUNTER
We could consider doing patches and gum together to provide steady nicotine, but also help calm cravings that are not satisfied with the patches. Had she ever considered this?

## 2023-03-16 RX ORDER — NICOTINE 21 MG/24HR
1 PATCH, TRANSDERMAL 24 HOURS TRANSDERMAL EVERY 24 HOURS
Qty: 28 EACH | Refills: 0 | Status: SHIPPED | OUTPATIENT
Start: 2023-03-16

## 2023-03-16 RX ORDER — NICOTINE 21 MG/24HR
1 PATCH, TRANSDERMAL 24 HOURS TRANSDERMAL EVERY 24 HOURS
Qty: 21 EACH | Refills: 0 | Status: SHIPPED | OUTPATIENT
Start: 2023-03-16

## 2023-03-21 ENCOUNTER — HOSPITAL ENCOUNTER (OUTPATIENT)
Dept: CT IMAGING | Facility: HOSPITAL | Age: 73
Discharge: HOME | End: 2023-03-21
Admitting: INTERNAL MEDICINE
Payer: MEDICARE

## 2023-03-21 DIAGNOSIS — Z87.891 PERSONAL HISTORY OF NICOTINE DEPENDENCE: ICD-10-CM

## 2023-03-21 PROCEDURE — 71271 CT THORAX LUNG CANCER SCR C-: CPT

## 2023-04-28 ENCOUNTER — HOSPITAL ENCOUNTER (OUTPATIENT)
Dept: GENERAL RADIOLOGY | Facility: HOSPITAL | Age: 73
Discharge: HOME | End: 2023-04-28
Payer: MEDICARE

## 2023-04-28 ENCOUNTER — OFFICE VISIT (OUTPATIENT)
Dept: INTERNAL MEDICINE | Facility: CLINIC | Age: 73
End: 2023-04-28
Payer: MEDICARE

## 2023-04-28 VITALS
BODY MASS INDEX: 30.32 KG/M2 | TEMPERATURE: 95.7 F | DIASTOLIC BLOOD PRESSURE: 78 MMHG | OXYGEN SATURATION: 98 % | SYSTOLIC BLOOD PRESSURE: 122 MMHG | WEIGHT: 150.4 LBS | RESPIRATION RATE: 16 BRPM | HEIGHT: 59 IN | HEART RATE: 72 BPM

## 2023-04-28 DIAGNOSIS — M75.22 BICEPS TENDINITIS OF LEFT UPPER EXTREMITY: ICD-10-CM

## 2023-04-28 DIAGNOSIS — G89.29 CHRONIC LEFT SHOULDER PAIN: Primary | ICD-10-CM

## 2023-04-28 DIAGNOSIS — G89.29 CHRONIC LEFT SHOULDER PAIN: ICD-10-CM

## 2023-04-28 DIAGNOSIS — M25.512 CHRONIC LEFT SHOULDER PAIN: ICD-10-CM

## 2023-04-28 DIAGNOSIS — F17.210 CIGARETTE SMOKER: ICD-10-CM

## 2023-04-28 DIAGNOSIS — M25.512 CHRONIC LEFT SHOULDER PAIN: Primary | ICD-10-CM

## 2023-04-28 DIAGNOSIS — M75.02 ADHESIVE CAPSULITIS OF LEFT SHOULDER: ICD-10-CM

## 2023-04-28 PROCEDURE — 73030 X-RAY EXAM OF SHOULDER: CPT

## 2023-04-28 PROCEDURE — 3074F SYST BP LT 130 MM HG: CPT | Performed by: INTERNAL MEDICINE

## 2023-04-28 PROCEDURE — 99214 OFFICE O/P EST MOD 30 MIN: CPT | Performed by: INTERNAL MEDICINE

## 2023-04-28 PROCEDURE — 1159F MED LIST DOCD IN RCRD: CPT | Performed by: INTERNAL MEDICINE

## 2023-04-28 PROCEDURE — 1160F RVW MEDS BY RX/DR IN RCRD: CPT | Performed by: INTERNAL MEDICINE

## 2023-04-28 PROCEDURE — 3078F DIAST BP <80 MM HG: CPT | Performed by: INTERNAL MEDICINE

## 2023-04-28 RX ORDER — CYCLOBENZAPRINE HCL 5 MG
5 TABLET ORAL 2 TIMES DAILY PRN
Qty: 30 TABLET | Refills: 1 | Status: SHIPPED | OUTPATIENT
Start: 2023-04-28

## 2023-04-28 RX ORDER — NAPROXEN 500 MG/1
500 TABLET ORAL 2 TIMES DAILY WITH MEALS
Qty: 20 TABLET | Refills: 0 | Status: SHIPPED | OUTPATIENT
Start: 2023-04-28 | End: 2023-05-08

## 2023-04-28 NOTE — PROGRESS NOTES
CC: left shoulder pain    History:  Hillary Mann is a 72 y.o. female   She notes ongoing pain that has been present for ~1 year, but has worsened over the last 4 months. She now has little motion to it due to pain, but has had no known injury. She has not taken anything for it besides acetaminophen.       ROS:  Review of Systems   Respiratory: Negative for shortness of breath.    Cardiovascular: Negative for chest pain.   Musculoskeletal: Positive for arthralgias.        reports that she has been smoking cigarettes. She started smoking about 53 years ago. She has a 89.25 pack-year smoking history. She has been exposed to tobacco smoke. She has never used smokeless tobacco. She reports that she does not drink alcohol and does not use drugs.      Current Outpatient Medications:   •  amLODIPine (NORVASC) 10 MG tablet, Take 1 tablet by mouth once daily, Disp: 90 tablet, Rfl: 3  •  aspirin 81 MG chewable tablet, Chew 1 tablet Daily., Disp: , Rfl:   •  Cholecalciferol (VITAMIN D) 2000 units capsule, Take 2 capsules by mouth Daily., Disp: , Rfl:   •  Diclofenac Sodium (VOLTAREN) 1 % gel gel, Apply 4 g topically to the appropriate area as directed 4 (Four) Times a Day As Needed (knee pain)., Disp: 350 g, Rfl: 1  •  nicotine (NICODERM CQ) 21 MG/24HR patch, Place 1 patch on the skin as directed by provider Daily., Disp: 28 each, Rfl: 0  •  olmesartan (BENICAR) 40 MG tablet, Take 1 tablet by mouth Daily., Disp: 90 tablet, Rfl: 1  •  pravastatin (PRAVACHOL) 10 MG tablet, Take 1 tablet by mouth Daily., Disp: 90 tablet, Rfl: 3  •  nicotine (NICODERM CQ) 14 MG/24HR patch, Place 1 patch on the skin as directed by provider Daily. (Patient not taking: Reported on 4/28/2023), Disp: 21 each, Rfl: 0  •  nicotine (NICODERM CQ) 7 MG/24HR patch, Place 1 patch on the skin as directed by provider Daily. (Patient not taking: Reported on 4/28/2023), Disp: 14 each, Rfl: 1    OBJECTIVE:  /78 (BP Location: Left arm, Patient Position:  "Sitting, Cuff Size: Adult)   Pulse 72   Temp 95.7 °F (35.4 °C)   Resp 16   Ht 149.9 cm (59\")   Wt 68.2 kg (150 lb 6.4 oz)   SpO2 98%   BMI 30.38 kg/m²    Physical Exam  Constitutional:       General: She is not in acute distress.  Pulmonary:      Effort: Pulmonary effort is normal. No respiratory distress.   Musculoskeletal:      Comments: Neers and Anthony on the left are unable to be completed due to guarding and pain on any ROM. Positive Yergason's on the left. Tenderness to cervical paraspinals, trapezius, periscapular muscles and AC joint on the left.    Neurological:      Mental Status: She is alert and oriented to person, place, and time.         Assessment/Plan     Diagnoses and all orders for this visit:    1. Chronic left shoulder pain (Primary)    2. Biceps tendinitis of left upper extremity  3. Adhesive capsulitis of left shoulder  -     XR Shoulder 2+ View Left; Future  -     Ambulatory Referral to Physical Therapy Evaluate and treat; Stretching, ROM, Strengthening  -     naproxen (Naprosyn) 500 MG tablet; Take 1 tablet by mouth 2 (Two) Times a Day With Meals for 10 days.  Dispense: 20 tablet; Refill: 0  -     cyclobenzaprine (FLEXERIL) 5 MG tablet; Take 1 tablet by mouth 2 (Two) Times a Day As Needed for Muscle Spasms.  Dispense: 30 tablet; Refill: 1  Suspect multifactorial etiology, but very difficult to assess due to guarding and pain. Roentgenogram for evaluation and comparison to last year's film given worsening. NSAID and muscle relaxer to assist and will refer to PT for evaluation. She has an aneurysm clip, which precludes MRI evaluation, but we may need to consider CT Arthrogram if not improving.     4. Cigarette smoker  Patient was counseled on and understood the many dangers of continuing to use tobacco. Despite this, Ms. Mann states quitting is not an immediate priority at this time. I reminded the patient that if quitting becomes an increased priority to contact us for help with " quitting including pharmacologic & nonpharmacologic options or any additional resources.    An After Visit Summary was printed and given to the patient at discharge.  Return for Next scheduled follow up.      Christopher Jo D.O. 4/28/2023   Electronically signed.

## 2023-06-06 ENCOUNTER — OFFICE VISIT (OUTPATIENT)
Dept: INTERNAL MEDICINE | Facility: CLINIC | Age: 73
End: 2023-06-06
Payer: MEDICARE

## 2023-06-06 ENCOUNTER — LAB (OUTPATIENT)
Dept: LAB | Facility: HOSPITAL | Age: 73
End: 2023-06-06
Payer: MEDICARE

## 2023-06-06 VITALS
OXYGEN SATURATION: 98 % | HEART RATE: 72 BPM | DIASTOLIC BLOOD PRESSURE: 82 MMHG | RESPIRATION RATE: 16 BRPM | SYSTOLIC BLOOD PRESSURE: 114 MMHG | BODY MASS INDEX: 31.65 KG/M2 | TEMPERATURE: 97.5 F | HEIGHT: 59 IN | WEIGHT: 157 LBS

## 2023-06-06 DIAGNOSIS — E66.09 CLASS 1 OBESITY DUE TO EXCESS CALORIES WITH SERIOUS COMORBIDITY AND BODY MASS INDEX (BMI) OF 31.0 TO 31.9 IN ADULT: ICD-10-CM

## 2023-06-06 DIAGNOSIS — E55.9 VITAMIN D DEFICIENCY: ICD-10-CM

## 2023-06-06 DIAGNOSIS — E78.2 MIXED HYPERLIPIDEMIA: ICD-10-CM

## 2023-06-06 DIAGNOSIS — Z00.00 MEDICARE ANNUAL WELLNESS VISIT, SUBSEQUENT: ICD-10-CM

## 2023-06-06 DIAGNOSIS — Z00.00 MEDICARE ANNUAL WELLNESS VISIT, SUBSEQUENT: Primary | ICD-10-CM

## 2023-06-06 DIAGNOSIS — F17.210 CIGARETTE SMOKER: ICD-10-CM

## 2023-06-06 DIAGNOSIS — I10 ESSENTIAL HYPERTENSION: ICD-10-CM

## 2023-06-06 LAB
25(OH)D3 SERPL-MCNC: 106 NG/ML (ref 30–100)
ALBUMIN SERPL-MCNC: 4.5 G/DL (ref 3.5–5.2)
ALBUMIN/GLOB SERPL: 1.5 G/DL
ALP SERPL-CCNC: 109 U/L (ref 39–117)
ALT SERPL W P-5'-P-CCNC: 12 U/L (ref 1–33)
ANION GAP SERPL CALCULATED.3IONS-SCNC: 11 MMOL/L (ref 5–15)
AST SERPL-CCNC: 18 U/L (ref 1–32)
BILIRUB SERPL-MCNC: 0.3 MG/DL (ref 0–1.2)
BUN SERPL-MCNC: 13 MG/DL (ref 8–23)
BUN/CREAT SERPL: 14.6 (ref 7–25)
CALCIUM SPEC-SCNC: 9.2 MG/DL (ref 8.6–10.5)
CHLORIDE SERPL-SCNC: 105 MMOL/L (ref 98–107)
CHOLEST SERPL-MCNC: 180 MG/DL (ref 0–200)
CO2 SERPL-SCNC: 27 MMOL/L (ref 22–29)
CREAT SERPL-MCNC: 0.89 MG/DL (ref 0.57–1)
DEPRECATED RDW RBC AUTO: 42.5 FL (ref 37–54)
EGFRCR SERPLBLD CKD-EPI 2021: 69.8 ML/MIN/1.73
ERYTHROCYTE [DISTWIDTH] IN BLOOD BY AUTOMATED COUNT: 13.6 % (ref 12.3–15.4)
GLOBULIN UR ELPH-MCNC: 3.1 GM/DL
GLUCOSE SERPL-MCNC: 90 MG/DL (ref 65–99)
HBA1C MFR BLD: 5.7 % (ref 4.8–5.6)
HCT VFR BLD AUTO: 42.9 % (ref 34–46.6)
HDLC SERPL-MCNC: 44 MG/DL (ref 40–60)
HGB BLD-MCNC: 14.2 G/DL (ref 12–15.9)
LDLC SERPL CALC-MCNC: 99 MG/DL (ref 0–100)
LDLC/HDLC SERPL: 2.1 {RATIO}
MCH RBC QN AUTO: 28.6 PG (ref 26.6–33)
MCHC RBC AUTO-ENTMCNC: 33.1 G/DL (ref 31.5–35.7)
MCV RBC AUTO: 86.5 FL (ref 79–97)
PLATELET # BLD AUTO: 423 10*3/MM3 (ref 140–450)
PMV BLD AUTO: 10.1 FL (ref 6–12)
POTASSIUM SERPL-SCNC: 3.8 MMOL/L (ref 3.5–5.2)
PROT SERPL-MCNC: 7.6 G/DL (ref 6–8.5)
RBC # BLD AUTO: 4.96 10*6/MM3 (ref 3.77–5.28)
SODIUM SERPL-SCNC: 143 MMOL/L (ref 136–145)
TRIGL SERPL-MCNC: 218 MG/DL (ref 0–150)
VLDLC SERPL-MCNC: 37 MG/DL (ref 5–40)
WBC NRBC COR # BLD: 8.64 10*3/MM3 (ref 3.4–10.8)

## 2023-06-06 PROCEDURE — G0439 PPPS, SUBSEQ VISIT: HCPCS | Performed by: NURSE PRACTITIONER

## 2023-06-06 PROCEDURE — 82306 VITAMIN D 25 HYDROXY: CPT

## 2023-06-06 PROCEDURE — 85027 COMPLETE CBC AUTOMATED: CPT

## 2023-06-06 PROCEDURE — 3079F DIAST BP 80-89 MM HG: CPT | Performed by: NURSE PRACTITIONER

## 2023-06-06 PROCEDURE — 3074F SYST BP LT 130 MM HG: CPT | Performed by: NURSE PRACTITIONER

## 2023-06-06 PROCEDURE — 83036 HEMOGLOBIN GLYCOSYLATED A1C: CPT

## 2023-06-06 PROCEDURE — 36415 COLL VENOUS BLD VENIPUNCTURE: CPT

## 2023-06-06 PROCEDURE — 80061 LIPID PANEL: CPT

## 2023-06-06 PROCEDURE — 1170F FXNL STATUS ASSESSED: CPT | Performed by: NURSE PRACTITIONER

## 2023-06-06 PROCEDURE — 80053 COMPREHEN METABOLIC PANEL: CPT

## 2023-06-06 RX ORDER — AMLODIPINE BESYLATE 10 MG/1
10 TABLET ORAL DAILY
Qty: 90 TABLET | Refills: 3 | Status: SHIPPED | OUTPATIENT
Start: 2023-06-06

## 2023-06-06 NOTE — PROGRESS NOTES
The ABCs of the Annual Wellness Visit  Subsequent Medicare Wellness Visit    Subjective      Hillary Mann is a 72 y.o. female who presents for a Subsequent Medicare Wellness Visit.    The following portions of the patient's history were reviewed and   updated as appropriate: allergies, current medications, past family history, past medical history, past social history, past surgical history, and problem list.    Compared to one year ago, the patient feels her physical   health is the same.    Compared to one year ago, the patient feels her mental   health is the same.    Recent Hospitalizations:  She was not admitted to the hospital during the last year.       Current Medical Providers:  Patient Care Team:  Christopher Jo DO as PCP - General (Internal Medicine)  LORNE Martin MD as Consulting Physician (Orthopedic Surgery)    Outpatient Medications Prior to Visit   Medication Sig Dispense Refill    aspirin 81 MG chewable tablet Chew 1 tablet Daily.      Cholecalciferol (VITAMIN D) 2000 units capsule Take 2 capsules by mouth Daily.      Diclofenac Sodium (VOLTAREN) 1 % gel gel Apply 4 g topically to the appropriate area as directed 4 (Four) Times a Day As Needed (knee pain). 350 g 1    nicotine (NICODERM CQ) 21 MG/24HR patch Place 1 patch on the skin as directed by provider Daily. 28 each 0    olmesartan (BENICAR) 40 MG tablet Take 1 tablet by mouth Daily. 90 tablet 1    amLODIPine (NORVASC) 10 MG tablet Take 1 tablet by mouth once daily 90 tablet 3    nicotine (NICODERM CQ) 14 MG/24HR patch Place 1 patch on the skin as directed by provider Daily. (Patient not taking: Reported on 4/28/2023) 21 each 0    nicotine (NICODERM CQ) 7 MG/24HR patch Place 1 patch on the skin as directed by provider Daily. (Patient not taking: Reported on 4/28/2023) 14 each 1    pravastatin (PRAVACHOL) 10 MG tablet Take 1 tablet by mouth Daily. (Patient not taking: Reported on 6/6/2023) 90 tablet 3    cyclobenzaprine  "(FLEXERIL) 5 MG tablet Take 1 tablet by mouth 2 (Two) Times a Day As Needed for Muscle Spasms. 30 tablet 1     No facility-administered medications prior to visit.       No opioid medication identified on active medication list. I have reviewed chart for other potential  high risk medication/s and harmful drug interactions in the elderly.        Aspirin is on active medication list. Aspirin use is indicated based on review of current medical condition/s. Pros and cons of this therapy have been discussed today. Benefits of this medication outweigh potential harm.  Patient has been encouraged to continue taking this medication.  .      Patient Active Problem List   Diagnosis    Essential hypertension    Overweight (BMI 25.0-29.9)    Vitamin D deficiency    Cigarette smoker    Mild mitral regurgitation    Osteopenia of left hip    Urge incontinence of urine    Mixed hyperlipidemia     Advance Care Planning   Advance Care Planning     Advance Directive is not on file.  ACP discussion was held with the patient during this visit. Patient has an advance directive (not in EMR), copy requested.     Objective    Vitals:    06/06/23 1038   BP: 114/82   BP Location: Left arm   Patient Position: Sitting   Cuff Size: Adult   Pulse: 72   Resp: 16   Temp: 97.5 °F (36.4 °C)   TempSrc: Temporal   SpO2: 98%   Weight: 71.2 kg (157 lb)   Height: 149.9 cm (59\")     Estimated body mass index is 31.71 kg/m² as calculated from the following:    Height as of this encounter: 149.9 cm (59\").    Weight as of this encounter: 71.2 kg (157 lb).    BMI is >= 30 and <35. (Class 1 Obesity). The following options were offered after discussion;: exercise counseling/recommendations and nutrition counseling/recommendations      Does the patient have evidence of cognitive impairment?   No            HEALTH RISK ASSESSMENT    Smoking Status:  Social History     Tobacco Use   Smoking Status Every Day    Packs/day: 1.75    Years: 51.00    Pack years: 89.25 "    Types: Cigarettes    Start date:     Passive exposure: Current   Smokeless Tobacco Never   Tobacco Comments    down to 1/2 pack a day 23     Alcohol Consumption:  Social History     Substance and Sexual Activity   Alcohol Use No     Fall Risk Screen:    ANGEL Fall Risk Assessment has not been completed.    Depression Screenin/6/2023    10:41 AM   PHQ-2/PHQ-9 Depression Screening   Little Interest or Pleasure in Doing Things 0-->not at all   Feeling Down, Depressed or Hopeless 0-->not at all   PHQ-9: Brief Depression Severity Measure Score 0       Health Habits and Functional and Cognitive Screenin/6/2023    10:00 AM   Functional & Cognitive Status   Do you have difficulty preparing food and eating? No   Do you have difficulty bathing yourself, getting dressed or grooming yourself? No   Do you have difficulty using the toilet? No   Do you have difficulty moving around from place to place? No   Do you have trouble with steps or getting out of a bed or a chair? Yes   Current Diet Well Balanced Diet   Dental Exam Not up to date   Eye Exam Not up to date   Exercise (times per week) 3 times per week        Exercise Frequency Comment walks at least 3 times weekly   Current Exercises Include Walking   Do you need help using the phone?  No   Are you deaf or do you have serious difficulty hearing?  Yes   Do you need help with transportation? No   Do you need help shopping? No   Do you need help preparing meals?  No   Do you need help with housework?  No   Do you need help with laundry? No   Do you need help taking your medications? No   Do you need help managing money? No   Do you ever drive or ride in a car without wearing a seat belt? No   Have you felt unusual stress, anger or loneliness in the last month? No   Who do you live with? Spouse   If you need help, do you have trouble finding someone available to you? No   Have you been bothered in the last four weeks by sexual problems? No   Do  you have difficulty concentrating, remembering or making decisions? No       Age-appropriate Screening Schedule:  Refer to the list below for future screening recommendations based on patient's age, sex and/or medical conditions. Orders for these recommended tests are listed in the plan section. The patient has been provided with a written plan.    Health Maintenance   Topic Date Due    TDAP/TD VACCINES (1 - Tdap) Never done    ZOSTER VACCINE (1 of 2) Never done    LIPID PANEL  05/24/2023    COVID-19 Vaccine (3 - Moderna series) 06/08/2023 (Originally 11/22/2021)    INFLUENZA VACCINE  08/01/2023    MAMMOGRAM  12/29/2023    LUNG CANCER SCREENING  03/21/2024    ANNUAL WELLNESS VISIT  06/06/2024    DXA SCAN  06/08/2024    COLORECTAL CANCER SCREENING  06/23/2025    HEPATITIS C SCREENING  Completed    Pneumococcal Vaccine 65+  Completed                  CMS Preventative Services Quick Reference  Risk Factors Identified During Encounter:    Hearing Problem:  she has completed testing in the past. She will let us know if she would prefer to see ENT again.   Immunizations Discussed/Encouraged: Tdap, Shingrix, and COVID19  Tobacco Use/Dependance Risk (use dotphrase .tobaccocessation for documentation)    The above risks/problems have been discussed with the patient.  Pertinent information has been shared with the patient in the After Visit Summary.    Diagnoses and all orders for this visit:    1. Medicare annual wellness visit, subsequent (Primary)  -     CBC No Differential; Future  -     Comprehensive metabolic panel; Future  -     Hemoglobin A1c; Future    2. Vitamin D deficiency  -     Vitamin D 25 hydroxy; Future    3. Mixed hyperlipidemia  -     Lipid panel; Future    4. Essential hypertension  -     amLODIPine (NORVASC) 10 MG tablet; Take 1 tablet by mouth Daily.  Dispense: 90 tablet; Refill: 3    5. Cigarette smoker    6. Class 1 obesity due to excess calories with serious comorbidity and body mass index (BMI) of  31.0 to 31.9 in adult        Follow Up:   Next Medicare Wellness visit to be scheduled in 1 year.      An After Visit Summary and PPPS were made available to the patient.

## 2023-06-06 NOTE — PROGRESS NOTES
The ABCs of the Annual Wellness Visit  Subsequent Medicare Wellness Visit    Subjective    Hillary Mann is a 72 y.o. female who presents for a Subsequent Medicare Wellness Visit.    The following portions of the patient's history were reviewed and   updated as appropriate: allergies, current medications, past family history, past medical history, past social history, past surgical history, and problem list.    Compared to one year ago, the patient feels her physical   health is the same.    Compared to one year ago, the patient feels her mental   health is the same.    Recent Hospitalizations:  She was not admitted to the hospital during the last year.       Current Medical Providers:  Patient Care Team:  Christopher Jo DO as PCP - General (Internal Medicine)  LORNE Martin MD as Consulting Physician (Orthopedic Surgery)    Outpatient Medications Prior to Visit   Medication Sig Dispense Refill    aspirin 81 MG chewable tablet Chew 1 tablet Daily.      Cholecalciferol (VITAMIN D) 2000 units capsule Take 2 capsules by mouth Daily.      Diclofenac Sodium (VOLTAREN) 1 % gel gel Apply 4 g topically to the appropriate area as directed 4 (Four) Times a Day As Needed (knee pain). 350 g 1    nicotine (NICODERM CQ) 21 MG/24HR patch Place 1 patch on the skin as directed by provider Daily. 28 each 0    olmesartan (BENICAR) 40 MG tablet Take 1 tablet by mouth Daily. 90 tablet 1    amLODIPine (NORVASC) 10 MG tablet Take 1 tablet by mouth once daily 90 tablet 3    nicotine (NICODERM CQ) 14 MG/24HR patch Place 1 patch on the skin as directed by provider Daily. (Patient not taking: Reported on 4/28/2023) 21 each 0    nicotine (NICODERM CQ) 7 MG/24HR patch Place 1 patch on the skin as directed by provider Daily. (Patient not taking: Reported on 4/28/2023) 14 each 1    pravastatin (PRAVACHOL) 10 MG tablet Take 1 tablet by mouth Daily. (Patient not taking: Reported on 6/6/2023) 90 tablet 3    cyclobenzaprine (FLEXERIL)  "5 MG tablet Take 1 tablet by mouth 2 (Two) Times a Day As Needed for Muscle Spasms. 30 tablet 1     No facility-administered medications prior to visit.       No opioid medication identified on active medication list. I have reviewed chart for other potential  high risk medication/s and harmful drug interactions in the elderly.        Aspirin is on active medication list. Aspirin use is indicated based on review of current medical condition/s. Pros and cons of this therapy have been discussed today. Benefits of this medication outweigh potential harm.  Patient has been encouraged to continue taking this medication.  .      Patient Active Problem List   Diagnosis    Essential hypertension    Overweight (BMI 25.0-29.9)    Vitamin D deficiency    Cigarette smoker    Mild mitral regurgitation    Osteopenia of left hip    Urge incontinence of urine    Mixed hyperlipidemia     Advance Care Planning   Advance Care Planning     Advance Directive is not on file.  ACP discussion was held with the patient during this visit. Patient has an advance directive (not in EMR), copy requested.     Objective    Vitals:    06/06/23 1038   BP: 114/82   BP Location: Left arm   Patient Position: Sitting   Cuff Size: Adult   Pulse: 72   Resp: 16   Temp: 97.5 °F (36.4 °C)   TempSrc: Temporal   SpO2: 98%   Weight: 71.2 kg (157 lb)   Height: 149.9 cm (59\")     Estimated body mass index is 31.71 kg/m² as calculated from the following:    Height as of this encounter: 149.9 cm (59\").    Weight as of this encounter: 71.2 kg (157 lb).    BMI is >= 30 and <35. (Class 1 Obesity). The following options were offered after discussion;: exercise counseling/recommendations and nutrition counseling/recommendations      Does the patient have evidence of cognitive impairment? No          HEALTH RISK ASSESSMENT    Smoking Status:  Social History     Tobacco Use   Smoking Status Every Day    Packs/day: 1.75    Years: 51.00    Pack years: 89.25    Types: " Cigarettes    Start date:     Passive exposure: Current   Smokeless Tobacco Never   Tobacco Comments    down to 1/2 pack a day 23     Alcohol Consumption:  Social History     Substance and Sexual Activity   Alcohol Use No     Fall Risk Screen:    ANGEL Fall Risk Assessment has not been completed.    Depression Screenin/6/2023    10:41 AM   PHQ-2/PHQ-9 Depression Screening   Little Interest or Pleasure in Doing Things 0-->not at all   Feeling Down, Depressed or Hopeless 0-->not at all   PHQ-9: Brief Depression Severity Measure Score 0       Health Habits and Functional and Cognitive Screenin/6/2023    10:00 AM   Functional & Cognitive Status   Do you have difficulty preparing food and eating? No   Do you have difficulty bathing yourself, getting dressed or grooming yourself? No   Do you have difficulty using the toilet? No   Do you have difficulty moving around from place to place? No   Do you have trouble with steps or getting out of a bed or a chair? Yes   Current Diet Well Balanced Diet   Dental Exam Not up to date   Eye Exam Not up to date   Exercise (times per week) 3 times per week        Exercise Frequency Comment walks at least 3 times weekly   Current Exercises Include Walking   Do you need help using the phone?  No   Are you deaf or do you have serious difficulty hearing?  Yes   Do you need help with transportation? No   Do you need help shopping? No   Do you need help preparing meals?  No   Do you need help with housework?  No   Do you need help with laundry? No   Do you need help taking your medications? No   Do you need help managing money? No   Do you ever drive or ride in a car without wearing a seat belt? No   Have you felt unusual stress, anger or loneliness in the last month? No   Who do you live with? Spouse   If you need help, do you have trouble finding someone available to you? No   Have you been bothered in the last four weeks by sexual problems? No   Do you have  difficulty concentrating, remembering or making decisions? No       Age-appropriate Screening Schedule:  Refer to the list below for future screening recommendations based on patient's age, sex and/or medical conditions. Orders for these recommended tests are listed in the plan section. The patient has been provided with a written plan.    Health Maintenance   Topic Date Due    TDAP/TD VACCINES (1 - Tdap) Never done    ZOSTER VACCINE (1 of 2) Never done    LIPID PANEL  05/24/2023    COVID-19 Vaccine (3 - Moderna series) 06/08/2023 (Originally 11/22/2021)    INFLUENZA VACCINE  08/01/2023    MAMMOGRAM  12/29/2023    LUNG CANCER SCREENING  03/21/2024    ANNUAL WELLNESS VISIT  06/06/2024    DXA SCAN  06/08/2024    COLORECTAL CANCER SCREENING  06/23/2025    HEPATITIS C SCREENING  Completed    Pneumococcal Vaccine 65+  Completed                  CMS Preventative Services Quick Reference  Risk Factors Identified During Encounter  Hearing Problem: {Hearing Referrals:4625765726}  Immunizations Discussed/Encouraged: {Specific Immunizations:6525549023}  Tobacco Use/Dependance Risk (use dotphrase .tobaccocessation for documentation)  The above risks/problems have been discussed with the patient.  Pertinent information has been shared with the patient in the After Visit Summary.  An After Visit Summary and PPPS were made available to the patient.    Follow Up:   Next Medicare Wellness visit to be scheduled in 1 year.   {Wrapup  Review (Popup)  Advance Care Planning  Labs  CC  Problem List  Visit Diagnosis  Medications  Result Review  Imaging  Health Maintenance  Quality  BestPractice  SmartSets  SnapShot  Encounters  Notes  Media  Procedures :23}    Additional E&M Note during same encounter follows:  Patient has multiple medical problems which are significant and separately identifiable that require additional work above and beyond the Medicare Wellness Visit.      Chief Complaint  Medicare  "Wellness-subsequent    Subjective    {Problem List  Visit Diagnosis   Encounters  Notes  Medications  Labs  Result Review Imaging  Media :23}    HPI  Hillary Mann is also being seen today for ***    Review of Systems   Constitutional:  Negative for fever.   HENT: Negative.     Respiratory: Negative.     Cardiovascular: Negative.    Gastrointestinal: Negative.    Genitourinary: Negative.    Musculoskeletal: Negative.    Neurological: Negative.    Psychiatric/Behavioral: Negative.       Objective   Vital Signs:  /82 (BP Location: Left arm, Patient Position: Sitting, Cuff Size: Adult)   Pulse 72   Temp 97.5 °F (36.4 °C) (Temporal)   Resp 16   Ht 149.9 cm (59\")   Wt 71.2 kg (157 lb)   SpO2 98%   BMI 31.71 kg/m²     Physical Exam     {The following data was reviewed by (Optional):42021}  {Ambulatory Labs (Optional):47526}  {Data reviewed (Optional):55629:::1}           Assessment and Plan {CC Problem List  Visit Diagnosis   ROS  Review (Popup)  Health Maintenance  Quality  BestPractice  Medications  SmartSets  SnapShot Encounters  Media :23}  Diagnoses and all orders for this visit:    1. Medicare annual wellness visit, subsequent (Primary)  -     CBC No Differential; Future  -     Comprehensive metabolic panel; Future  -     Hemoglobin A1c; Future    2. Vitamin D deficiency  -     Vitamin D 25 hydroxy; Future    3. Mixed hyperlipidemia  -     Lipid panel; Future    4. Essential hypertension  -     amLODIPine (NORVASC) 10 MG tablet; Take 1 tablet by mouth Daily.  Dispense: 90 tablet; Refill: 3    5. Cigarette smoker           {Time Spent (Optional):71551}  Follow Up {Instructions Charge Capture  Follow-up Communications :23}  Return in about 6 months (around 12/6/2023) for Recheck with Dr. Jo .  Patient was given instructions and counseling regarding her condition or for health maintenance advice. Please see specific information pulled into the AVS if appropriate.           "

## 2023-07-28 DIAGNOSIS — I10 ESSENTIAL HYPERTENSION: ICD-10-CM

## 2023-07-31 RX ORDER — OLMESARTAN MEDOXOMIL 40 MG/1
40 TABLET ORAL DAILY
Qty: 90 TABLET | Refills: 3 | Status: SHIPPED | OUTPATIENT
Start: 2023-07-31

## 2023-12-07 ENCOUNTER — OFFICE VISIT (OUTPATIENT)
Dept: INTERNAL MEDICINE | Facility: CLINIC | Age: 73
End: 2023-12-07
Payer: MEDICARE

## 2023-12-07 VITALS
OXYGEN SATURATION: 98 % | HEART RATE: 77 BPM | SYSTOLIC BLOOD PRESSURE: 126 MMHG | DIASTOLIC BLOOD PRESSURE: 77 MMHG | BODY MASS INDEX: 32.34 KG/M2 | RESPIRATION RATE: 16 BRPM | WEIGHT: 160.4 LBS | HEIGHT: 59 IN

## 2023-12-07 DIAGNOSIS — Z23 NEED FOR VACCINATION: ICD-10-CM

## 2023-12-07 DIAGNOSIS — I10 PRIMARY HYPERTENSION: ICD-10-CM

## 2023-12-07 DIAGNOSIS — E66.09 CLASS 1 OBESITY DUE TO EXCESS CALORIES WITH SERIOUS COMORBIDITY AND BODY MASS INDEX (BMI) OF 32.0 TO 32.9 IN ADULT: ICD-10-CM

## 2023-12-07 DIAGNOSIS — F17.210 CIGARETTE SMOKER: ICD-10-CM

## 2023-12-07 DIAGNOSIS — G47.10 HYPERSOMNIA: ICD-10-CM

## 2023-12-07 DIAGNOSIS — R06.83 SNORING: Primary | ICD-10-CM

## 2023-12-07 DIAGNOSIS — G47.19 EXCESSIVE DAYTIME SLEEPINESS: ICD-10-CM

## 2023-12-07 PROCEDURE — G0008 ADMIN INFLUENZA VIRUS VAC: HCPCS | Performed by: INTERNAL MEDICINE

## 2023-12-07 PROCEDURE — 3078F DIAST BP <80 MM HG: CPT | Performed by: INTERNAL MEDICINE

## 2023-12-07 PROCEDURE — 99213 OFFICE O/P EST LOW 20 MIN: CPT | Performed by: INTERNAL MEDICINE

## 2023-12-07 PROCEDURE — 1160F RVW MEDS BY RX/DR IN RCRD: CPT | Performed by: INTERNAL MEDICINE

## 2023-12-07 PROCEDURE — 90662 IIV NO PRSV INCREASED AG IM: CPT | Performed by: INTERNAL MEDICINE

## 2023-12-07 PROCEDURE — 3074F SYST BP LT 130 MM HG: CPT | Performed by: INTERNAL MEDICINE

## 2023-12-07 PROCEDURE — 1159F MED LIST DOCD IN RCRD: CPT | Performed by: INTERNAL MEDICINE

## 2023-12-07 NOTE — PROGRESS NOTES
CC: trouble sleeping    History:  Hillary Mann is a 73 y.o. female   She notes she has been doing well, but has had trouble sleeping. She has leg pains that keep her awake. She does snore, but has never been told she has witnessed apnea.       ROS:  Review of Systems   Respiratory:  Negative for shortness of breath.    Cardiovascular:  Negative for chest pain.   Psychiatric/Behavioral:  Positive for sleep disturbance. Negative for dysphoric mood.         reports that she has been smoking cigarettes. She started smoking about 53 years ago. She has a 89.25 pack-year smoking history. She has been exposed to tobacco smoke. She has never used smokeless tobacco. She reports that she does not drink alcohol and does not use drugs.      Current Outpatient Medications:     amLODIPine (NORVASC) 10 MG tablet, Take 1 tablet by mouth Daily., Disp: 90 tablet, Rfl: 3    aspirin 81 MG chewable tablet, Chew 1 tablet Daily., Disp: , Rfl:     Cholecalciferol (VITAMIN D) 2000 units capsule, Take 2 capsules by mouth Daily., Disp: , Rfl:     Diclofenac Sodium (VOLTAREN) 1 % gel gel, Apply 4 g topically to the appropriate area as directed 4 (Four) Times a Day As Needed (knee pain)., Disp: 350 g, Rfl: 1    nicotine (NICODERM CQ) 21 MG/24HR patch, Place 1 patch on the skin as directed by provider Daily., Disp: 28 each, Rfl: 0    olmesartan (BENICAR) 40 MG tablet, Take 1 tablet by mouth Daily., Disp: 90 tablet, Rfl: 3    pravastatin (PRAVACHOL) 10 MG tablet, Take 1 tablet by mouth Daily., Disp: 90 tablet, Rfl: 3    nicotine (NICODERM CQ) 14 MG/24HR patch, Place 1 patch on the skin as directed by provider Daily. (Patient not taking: Reported on 12/7/2023), Disp: 21 each, Rfl: 0    nicotine (NICODERM CQ) 7 MG/24HR patch, Place 1 patch on the skin as directed by provider Daily. (Patient not taking: Reported on 12/7/2023), Disp: 14 each, Rfl: 1    OBJECTIVE:  /77 (BP Location: Left arm, Patient Position: Sitting, Cuff Size: Adult)    "Pulse 77   Resp 16   Ht 149.9 cm (59\")   Wt 72.8 kg (160 lb 6.4 oz)   SpO2 98%   BMI 32.40 kg/m²    Physical Exam  Constitutional:       General: She is not in acute distress.  Pulmonary:      Effort: Pulmonary effort is normal. No respiratory distress.   Neurological:      Mental Status: She is alert and oriented to person, place, and time.         Assessment/Plan     Diagnoses and all orders for this visit:    1. Snoring (Primary)  2. Excessive daytime sleepiness  3. Hypersomnia  Overnight oximetry for evaluation. MELISSA in the differential, but without significantly high pretest probability.     4. Primary hypertension  Well controlled, BP goal for age is <140/90 per JNC 8 guidelines, and continue current medications    5. Class 1 obesity due to excess calories with serious comorbidity and body mass index (BMI) of 32.0 to 32.9 in adult     6. Cigarette smoker  Patient was counseled on and understood the many dangers of continuing to use tobacco. Despite this, Ms. Mann states quitting is not an immediate priority at this time. I reminded the patient that if quitting becomes an increased priority to contact us for help with quitting including pharmacologic & nonpharmacologic options or any additional resources.    7. Need for vaccination  -     Fluzone High-Dose 65+yrs ([id number])    An After Visit Summary was printed and given to the patient at discharge.  Return in about 6 months (around 6/7/2024) for Medicare Wellness.      Christopher Jo D.O. 12/9/2023   Electronically signed.  "

## 2023-12-29 ENCOUNTER — TELEPHONE (OUTPATIENT)
Dept: INTERNAL MEDICINE | Facility: CLINIC | Age: 73
End: 2023-12-29
Payer: MEDICARE

## 2023-12-29 NOTE — TELEPHONE ENCOUNTER
"PATIENT HAS BEEN CALLED, GIVEN MESSAGE REGARDING DR. GARCIA MESSAGE.  PER DR BRAGG  \" OXYGEN DID DROP SOME DURING SLEEP, SO A FULL HOME SLEEP STUDY IS OUR NEXT STEP\".    PATIENT VOICED UNDERSTANDING.   "

## 2024-01-22 DIAGNOSIS — I10 ESSENTIAL HYPERTENSION: ICD-10-CM

## 2024-01-22 DIAGNOSIS — E78.2 MIXED HYPERLIPIDEMIA: ICD-10-CM

## 2024-01-22 RX ORDER — PRAVASTATIN SODIUM 10 MG
10 TABLET ORAL DAILY
Qty: 90 TABLET | Refills: 3 | Status: SHIPPED | OUTPATIENT
Start: 2024-01-22

## 2024-01-22 RX ORDER — AMLODIPINE BESYLATE 10 MG/1
10 TABLET ORAL DAILY
Qty: 90 TABLET | Refills: 3 | Status: SHIPPED | OUTPATIENT
Start: 2024-01-22

## 2024-01-22 RX ORDER — OLMESARTAN MEDOXOMIL 40 MG/1
40 TABLET ORAL DAILY
Qty: 90 TABLET | Refills: 3 | Status: SHIPPED | OUTPATIENT
Start: 2024-01-22

## 2024-01-30 ENCOUNTER — TELEPHONE (OUTPATIENT)
Dept: INTERNAL MEDICINE | Facility: CLINIC | Age: 74
End: 2024-01-30
Payer: MEDICARE

## 2024-01-30 DIAGNOSIS — Z87.891 PERSONAL HISTORY OF NICOTINE DEPENDENCE: Primary | ICD-10-CM

## 2024-01-30 DIAGNOSIS — Z12.31 ENCOUNTER FOR SCREENING MAMMOGRAM FOR MALIGNANT NEOPLASM OF BREAST: ICD-10-CM

## 2024-01-30 NOTE — TELEPHONE ENCOUNTER
Patient called she stated that she received a letter that she is due for lung cancer screening,  I looked in the chart and she is due in March 21-24

## 2024-01-30 NOTE — TELEPHONE ENCOUNTER
Done. She is due for mammogram as well, so they will schedule both to be done the same day in March.

## 2024-02-25 LAB
NCCN CRITERIA FLAG: NORMAL
TYRER CUZICK SCORE: 4.8

## 2024-03-26 ENCOUNTER — HOSPITAL ENCOUNTER (OUTPATIENT)
Dept: CT IMAGING | Facility: HOSPITAL | Age: 74
Discharge: HOME | End: 2024-03-26
Payer: MEDICARE

## 2024-03-26 ENCOUNTER — HOSPITAL ENCOUNTER (OUTPATIENT)
Dept: MAMMOGRAPHY | Facility: HOSPITAL | Age: 74
Discharge: HOME | End: 2024-03-26
Payer: MEDICARE

## 2024-03-26 DIAGNOSIS — Z87.891 PERSONAL HISTORY OF NICOTINE DEPENDENCE: ICD-10-CM

## 2024-03-26 DIAGNOSIS — Z12.31 ENCOUNTER FOR SCREENING MAMMOGRAM FOR MALIGNANT NEOPLASM OF BREAST: ICD-10-CM

## 2024-03-26 PROCEDURE — 77067 SCR MAMMO BI INCL CAD: CPT

## 2024-03-26 PROCEDURE — 71271 CT THORAX LUNG CANCER SCR C-: CPT

## 2024-03-26 PROCEDURE — 77063 BREAST TOMOSYNTHESIS BI: CPT

## 2024-06-06 ENCOUNTER — OFFICE VISIT (OUTPATIENT)
Dept: INTERNAL MEDICINE | Facility: CLINIC | Age: 74
End: 2024-06-06
Payer: MEDICARE

## 2024-06-06 ENCOUNTER — LAB (OUTPATIENT)
Dept: LAB | Facility: HOSPITAL | Age: 74
End: 2024-06-06
Payer: MEDICARE

## 2024-06-06 VITALS
RESPIRATION RATE: 16 BRPM | WEIGHT: 157.4 LBS | DIASTOLIC BLOOD PRESSURE: 73 MMHG | SYSTOLIC BLOOD PRESSURE: 129 MMHG | BODY MASS INDEX: 31.73 KG/M2 | OXYGEN SATURATION: 94 % | HEART RATE: 78 BPM | HEIGHT: 59 IN

## 2024-06-06 DIAGNOSIS — E78.2 MIXED HYPERLIPIDEMIA: ICD-10-CM

## 2024-06-06 DIAGNOSIS — F17.210 CIGARETTE SMOKER: ICD-10-CM

## 2024-06-06 DIAGNOSIS — I34.0 MILD MITRAL REGURGITATION: ICD-10-CM

## 2024-06-06 DIAGNOSIS — R73.02 IMPAIRED GLUCOSE TOLERANCE: ICD-10-CM

## 2024-06-06 DIAGNOSIS — Z00.01 ANNUAL VISIT FOR GENERAL ADULT MEDICAL EXAMINATION WITH ABNORMAL FINDINGS: ICD-10-CM

## 2024-06-06 DIAGNOSIS — R53.83 OTHER FATIGUE: ICD-10-CM

## 2024-06-06 DIAGNOSIS — E55.9 VITAMIN D DEFICIENCY: ICD-10-CM

## 2024-06-06 DIAGNOSIS — M79.89 LEG SWELLING: Primary | ICD-10-CM

## 2024-06-06 DIAGNOSIS — E66.09 CLASS 1 OBESITY DUE TO EXCESS CALORIES WITH SERIOUS COMORBIDITY AND BODY MASS INDEX (BMI) OF 31.0 TO 31.9 IN ADULT: ICD-10-CM

## 2024-06-06 DIAGNOSIS — I10 PRIMARY HYPERTENSION: ICD-10-CM

## 2024-06-06 LAB
25(OH)D3 SERPL-MCNC: 43.5 NG/ML (ref 30–100)
ALBUMIN SERPL-MCNC: 4.3 G/DL (ref 3.5–5.2)
ALBUMIN/GLOB SERPL: 1.3 G/DL
ALP SERPL-CCNC: 112 U/L (ref 39–117)
ALT SERPL W P-5'-P-CCNC: 10 U/L (ref 1–33)
ANION GAP SERPL CALCULATED.3IONS-SCNC: 10 MMOL/L (ref 5–15)
AST SERPL-CCNC: 15 U/L (ref 1–32)
BILIRUB SERPL-MCNC: 0.3 MG/DL (ref 0–1.2)
BUN SERPL-MCNC: 9 MG/DL (ref 8–23)
BUN/CREAT SERPL: 12.9 (ref 7–25)
CALCIUM SPEC-SCNC: 9.3 MG/DL (ref 8.6–10.5)
CHLORIDE SERPL-SCNC: 106 MMOL/L (ref 98–107)
CHOLEST SERPL-MCNC: 196 MG/DL (ref 0–200)
CO2 SERPL-SCNC: 27 MMOL/L (ref 22–29)
CREAT SERPL-MCNC: 0.7 MG/DL (ref 0.57–1)
DEPRECATED RDW RBC AUTO: 45.5 FL (ref 37–54)
EGFRCR SERPLBLD CKD-EPI 2021: 92.6 ML/MIN/1.73
ERYTHROCYTE [DISTWIDTH] IN BLOOD BY AUTOMATED COUNT: 14.2 % (ref 12.3–15.4)
GLOBULIN UR ELPH-MCNC: 3.4 GM/DL
GLUCOSE SERPL-MCNC: 98 MG/DL (ref 65–99)
HBA1C MFR BLD: 5.6 % (ref 4.8–5.6)
HCT VFR BLD AUTO: 41.2 % (ref 34–46.6)
HDLC SERPL-MCNC: 47 MG/DL (ref 40–60)
HGB BLD-MCNC: 13.5 G/DL (ref 12–15.9)
LDLC SERPL CALC-MCNC: 110 MG/DL (ref 0–100)
LDLC/HDLC SERPL: 2.21 {RATIO}
MCH RBC QN AUTO: 28.6 PG (ref 26.6–33)
MCHC RBC AUTO-ENTMCNC: 32.8 G/DL (ref 31.5–35.7)
MCV RBC AUTO: 87.3 FL (ref 79–97)
PLATELET # BLD AUTO: 404 10*3/MM3 (ref 140–450)
PMV BLD AUTO: 9.3 FL (ref 6–12)
POTASSIUM SERPL-SCNC: 3.8 MMOL/L (ref 3.5–5.2)
PROT SERPL-MCNC: 7.7 G/DL (ref 6–8.5)
RBC # BLD AUTO: 4.72 10*6/MM3 (ref 3.77–5.28)
SODIUM SERPL-SCNC: 143 MMOL/L (ref 136–145)
TRIGL SERPL-MCNC: 226 MG/DL (ref 0–150)
TSH SERPL DL<=0.05 MIU/L-ACNC: 1.07 UIU/ML (ref 0.27–4.2)
VLDLC SERPL-MCNC: 39 MG/DL (ref 5–40)
WBC NRBC COR # BLD AUTO: 8.34 10*3/MM3 (ref 3.4–10.8)

## 2024-06-06 PROCEDURE — 80053 COMPREHEN METABOLIC PANEL: CPT

## 2024-06-06 PROCEDURE — 83036 HEMOGLOBIN GLYCOSYLATED A1C: CPT

## 2024-06-06 PROCEDURE — 84443 ASSAY THYROID STIM HORMONE: CPT

## 2024-06-06 PROCEDURE — 36415 COLL VENOUS BLD VENIPUNCTURE: CPT

## 2024-06-06 PROCEDURE — 82306 VITAMIN D 25 HYDROXY: CPT

## 2024-06-06 PROCEDURE — 80061 LIPID PANEL: CPT

## 2024-06-06 PROCEDURE — 85027 COMPLETE CBC AUTOMATED: CPT

## 2024-06-06 NOTE — PATIENT INSTRUCTIONS
Medicare Wellness  Personal Prevention Plan of Service     Date of Office Visit:    Encounter Provider:  Christopher Jo DO  Place of Service:  [facility]  Patient Name: Hillary Mann  :  1950    As part of the Medicare Wellness portion of your visit today, we are providing you with this personalized preventive plan of services (PPPS). This plan is based upon recommendations of the [place] Preventive Services Task Force (USPSTF) and the Advisory Committee on Immunization Practices (ACIP).    This lists the preventive care services that should be considered, and provides dates of when you are due. Items listed as completed are up-to-date and do not require any further intervention.    Health Maintenance   Topic Date Due    TDAP/TD VACCINES (1 - Tdap) Never done    ZOSTER VACCINE (1 of 2) Never done    RSV Vaccine - Adults (1 - 1-dose 60+ series) Never done    COVID-19 Vaccine (3 - -24 season) 2023    ANNUAL WELLNESS VISIT  2024    LIPID PANEL  2024    BMI FOLLOWUP  2024    DXA SCAN  2024    INFLUENZA VACCINE  2024    LUNG CANCER SCREENING  2025    COLORECTAL CANCER SCREENING  2025    MAMMOGRAM  2026    HEPATITIS C SCREENING  Completed    Pneumococcal Vaccine 65+  Completed       Orders Placed This Encounter   Procedures    Comprehensive Metabolic Panel     Standing Status:   Future     Standing Expiration Date:   2025     Order Specific Question:   Release to patient     Answer:   Routine Release [[id number]]    Hemoglobin A1c     Standing Status:   Future     Standing Expiration Date:   2025     Order Specific Question:   Release to patient     Answer:   Routine Release [[id number]]    Lipid Panel     Standing Status:   Future     Standing Expiration Date:   2025     Order Specific Question:   Release to patient     Answer:   Routine Release [[id number]]    CBC (No Diff)     Standing  Status:   Future     Standing Expiration Date:   6/6/2025     Order Specific Question:   Release to patient     Answer:   Routine Release [[id number]]    TSH     Standing Status:   Future     Standing Expiration Date:   6/6/2025     Order Specific Question:   Release to patient     Answer:   Routine Release [[id number]]    Vitamin D,25-Hydroxy     Standing Status:   Future     Standing Expiration Date:   6/6/2025     Order Specific Question:   Release to patient     Answer:   Routine Release [[id number]]    Adult Transthoracic Echo Complete W/ Cont if Necessary Per Protocol     Standing Status:   Future     Standing Expiration Date:   6/6/2025     Order Specific Question:   Reason for exam?     Answer:   Valvular Function     Order Specific Question:   Valvular Function specification?     Answer:   Native Valvular Regurg     Order Specific Question:   Native Valvular Regurg severity?     Answer:   Mild     Order Specific Question:   Release to patient     Answer:   Routine Release [[id number]]       No follow-ups on file.          Advance Care Planning and Advance Directives     You make decisions on a daily basis - decisions about where you want to live, your career, your home, your life. Perhaps one of the most important decisions you face is your choice for future medical care. Take time to talk with your family and your healthcare team and start planning today.  Advance Care Planning is a process that can help you:  Understand possible future healthcare decisions in light of your own experiences  Reflect on those decision in light of your goals and values  Discuss your decisions with those closest to you and the healthcare professionals that care for you  Make a plan by creating a document that reflects your wishes    Surrogate Decision Maker  In the event of a medical emergency, which has left you unable to communicate or to make your own decisions, you would need someone to make decisions for you.  It is  important to discuss your preferences for medical treatment with this person while you are in good health.     Qualities of a surrogate decision maker:  Willing to take on this role and responsibility  Knows what you want for future medical care  Willing to follow your wishes even if they don't agree with them  Able to make difficult medical decisions under stressful circumstances    Advance Directives  These are legal documents you can create that will guide your healthcare team and decision maker(s) when needed. These documents can be stored in the electronic medical record.    Living Will - a legal document to guide your care if you have a terminal condition or a serious illness and are unable to communicate. States vary by statute in document names/types, but most forms may include one or more of the following:        -  Directions regarding life-prolonging treatments        -  Directions regarding artificially provided nutrition/hydration        -  Choosing a healthcare decision maker        -  Direction regarding organ/tissue donation    Durable Power of  for Healthcare - this document names an -in-fact to make medical decisions for you, but it may also allow this person to make personal and financial decisions for you. Please seek the advice of an  if you need this type of document.    **Advance Directives are not required and no one may discriminate against you if you do not sign one.    Medical Orders  Many states allow specific forms/orders signed by your physician to record your wishes for medical treatment in your current state of health. This form, signed in personal communication with your physician, addresses resuscitation and other medical interventions that you may or may not want.      For more information or to schedule a time with a [facility] Advance Care Planning Facilitator contact: [email or web address] or call [phone] and someone will contact you directly.

## 2024-06-06 NOTE — PROGRESS NOTES
The ABCs of the Annual Wellness Visit  Subsequent Medicare Wellness Visit    Subjective    Hillary Mann is a 73 y.o. female who presents for a Subsequent Medicare Wellness Visit.    The following portions of the patient's history were reviewed and   updated as appropriate: allergies, current medications, past family history, past medical history, past social history, past surgical history, and problem list.    Compared to one year ago, the patient feels her physical   health is worse.    Compared to one year ago, the patient feels her mental   health is the same.    Recent Hospitalizations:  She was not admitted to the hospital during the last year.       Current Medical Providers:  Patient Care Team:  Christopher Jo DO as PCP - General (Internal Medicine)  LORNE Martin MD as Consulting Physician (Orthopedic Surgery)    Outpatient Medications Prior to Visit   Medication Sig Dispense Refill    amLODIPine (NORVASC) 10 MG tablet Take 1 tablet by mouth Daily. 90 tablet 3    aspirin 81 MG chewable tablet Chew 1 tablet Daily.      Diclofenac Sodium (VOLTAREN) 1 % gel gel Apply 4 g topically to the appropriate area as directed 4 (Four) Times a Day As Needed (knee pain). 350 g 1    olmesartan (BENICAR) 40 MG tablet Take 1 tablet by mouth Daily. 90 tablet 3    pravastatin (PRAVACHOL) 10 MG tablet Take 1 tablet by mouth Daily. 90 tablet 3    nicotine (NICODERM CQ) 14 MG/24HR patch Place 1 patch on the skin as directed by provider Daily. (Patient not taking: Reported on 12/7/2023) 21 each 0    nicotine (NICODERM CQ) 21 MG/24HR patch Place 1 patch on the skin as directed by provider Daily. (Patient not taking: Reported on 6/6/2024) 28 each 0    nicotine (NICODERM CQ) 7 MG/24HR patch Place 1 patch on the skin as directed by provider Daily. (Patient not taking: Reported on 12/7/2023) 14 each 1    Cholecalciferol (VITAMIN D) 2000 units capsule Take 2 capsules by mouth Daily. (Patient not taking: Reported on  "6/6/2024)       No facility-administered medications prior to visit.       No opioid medication identified on active medication list. I have reviewed chart for other potential  high risk medication/s and harmful drug interactions in the elderly.        Aspirin is on active medication list. Aspirin use is indicated based on review of current medical condition/s. Pros and cons of this therapy have been discussed today. Benefits of this medication outweigh potential harm.  Patient has been encouraged to continue taking this medication.  .      Patient Active Problem List   Diagnosis    Primary hypertension    Class 1 obesity due to excess calories with serious comorbidity and body mass index (BMI) of 31.0 to 31.9 in adult    Vitamin D deficiency    Cigarette smoker    Mild mitral regurgitation    Osteopenia of left hip    Urge incontinence of urine    Mixed hyperlipidemia     Advance Care Planning   Advance Care Planning     Advance Directive is not on file.  ACP discussion was held with the patient during this visit. Patient does not have an advance directive, information provided.     Objective    Vitals:    06/06/24 1006   BP: 129/73   BP Location: Left arm   Patient Position: Sitting   Cuff Size: Adult   Pulse: 78   Resp: 16   SpO2: 94%   Weight: 71.4 kg (157 lb 6.4 oz)   Height: 149.9 cm (59\")     Estimated body mass index is 31.79 kg/m² as calculated from the following:    Height as of this encounter: 149.9 cm (59\").    Weight as of this encounter: 71.4 kg (157 lb 6.4 oz).    BMI is >= 30 and <35. (Class 1 Obesity). The following options were offered after discussion;: exercise counseling/recommendations and nutrition counseling/recommendations      Does the patient have evidence of cognitive impairment? No    Lab Results   Component Value Date    TRIG 226 (H) 06/06/2024    HDL 47 06/06/2024     (H) 06/06/2024    VLDL 39 06/06/2024    HGBA1C 5.60 06/06/2024        HEALTH RISK ASSESSMENT    Smoking " Status:  Social History     Tobacco Use   Smoking Status Every Day    Current packs/day: 1.75    Average packs/day: 1.8 packs/day for 54.4 years (95.3 ttl pk-yrs)    Types: Cigarettes    Start date:     Passive exposure: Current   Smokeless Tobacco Never   Tobacco Comments    down to 1/2 pack a day 23--down to 4 cigs a day 24     Alcohol Consumption:  Social History     Substance and Sexual Activity   Alcohol Use No     Fall Risk Screen:    [facility]    Depression Screenin/6/2024    10:14 AM   PHQ-2/PHQ-9 Depression Screening   Little Interest or Pleasure in Doing Things 0-->not at all   Feeling Down, Depressed or Hopeless 0-->not at all   PHQ-9: Brief Depression Severity Measure Score 0       Health Habits and Functional and Cognitive Screenin/6/2024    10:00 AM   Functional & Cognitive Status   Do you have difficulty preparing food and eating? No   Do you have difficulty bathing yourself, getting dressed or grooming yourself? No   Do you have difficulty using the toilet? No   Do you have difficulty moving around from place to place? No   Do you have trouble with steps or getting out of a bed or a chair? No   Current Diet Well Balanced Diet   Dental Exam Not up to date   Eye Exam Not up to date   Exercise (times per week) 0 times per week   Current Exercises Include No Regular Exercise   Do you need help using the phone?  No   Are you deaf or do you have serious difficulty hearing?  No   Do you need help to go to places out of walking distance? No   Do you need help shopping? No   Do you need help preparing meals?  No   Do you need help with housework?  No   Do you need help with laundry? No   Do you need help taking your medications? No   Do you need help managing money? No   Do you ever drive or ride in a car without wearing a seat belt? No   Have you felt unusual stress, anger or loneliness in the last month? No   Who do you live with?  Spouse   If you need help, do you have trouble finding someone available to you? No   Have you been bothered in the last four weeks by sexual problems? No   Do you have difficulty concentrating, remembering or making decisions? No       Age-appropriate Screening Schedule:  Refer to the list below for future screening recommendations based on patient's age, sex and/or medical conditions. Orders for these recommended tests are listed in the plan section. The patient has been provided with a written plan.    Health Maintenance   Topic Date Due    TDAP/TD VACCINES (1 - Tdap) Never done    ZOSTER VACCINE (1 of 2) Never done    RSV Vaccine - Adults (1 - 1-dose 60+ series) Never done    COVID-19 Vaccine (3 - 2023-24 season) 09/01/2023    ANNUAL WELLNESS VISIT  06/06/2024    DXA SCAN  06/08/2024    INFLUENZA VACCINE  08/01/2024    LUNG CANCER SCREENING  03/26/2025    LIPID PANEL  06/06/2025    BMI FOLLOWUP  06/06/2025    COLORECTAL CANCER SCREENING  06/23/2025    MAMMOGRAM  03/26/2026    HEPATITIS C SCREENING  Completed    Pneumococcal Vaccine 65+  Completed                  CMS Preventative Services Quick Reference  Risk Factors Identified During Encounter  Fall Risk-High or Moderate: Discussed Fall Prevention in the home  Immunizations Discussed/Encouraged: Tdap, Influenza, Shingrix, COVID19, and RSV (Respiratory Syncytial Virus)  Dental Screening Recommended  Vision Screening Recommended  The above risks/problems have been discussed with the patient.  Pertinent information has been shared with the patient in the After Visit Summary.  An After Visit Summary and PPPS were made available to the patient.    Follow Up:   Next Medicare Wellness visit to be scheduled in 1 year.       Additional E&M Note during same encounter follows:  Patient has multiple medical problems which are significant and separately identifiable that require additional work above and beyond the Medicare Wellness Visit.      Chief Complaint  Leg  Swelling    Subjective        HPI  History of Present Illness  The patient presents for evaluation of multiple medical concerns.    The patient reports experiencing daily swelling in her left leg, with the right leg exhibiting similar swelling, albeit less severe than the left. The swelling intensifies as the day progresses, manifesting as a burning sensation and stretching sensation. This morning, she experienced a burning sensation in the anterior aspect of her leg. Despite the swelling, she occasionally forgets to wear her shoes. She has compression socks at home, but finds them difficult to wear due to a lack of length between her kneecap and ankle. Her occupation involves primarily sitting, and she has recently resumed part-time employment at a desk, which she suspects may have contributed to the swelling. To alleviate the swelling, she elevates her feet using a paper shrider under her desk. Post-dinner, she sleeps with her feet elevated, which alleviates the swelling by morning. She acknowledges a prolonged intake of salt.    The patient reports persistent fatigue and poor sleep quality. She denies snoring but acknowledges a normal sleep test in 01/2024. She expresses a lack of interest in activities such as cooking, laundry, or cleaning the house, opting instead to sleep. She denies feelings of depression. She expresses concern about her potassium levels. She has discontinued her vitamin D supplement due to elevated levels, but attempts to spend time in the sun.    The patient has been diagnosed with a heart murmur.   Her mother had swelling, low potassium, bad heart, and had a pacemaker.    Patient or patient representative verbalized consent for the use of Ambient Listening during the visit with  Christopher Jo DO for chart documentation. 6/8/2024  12:09 CDT           Objective   Vital Signs:  /73 (BP Location: Left arm, Patient Position: Sitting, Cuff Size: Adult)   Pulse 78   Resp 16   Ht  "149.9 cm (59\")   Wt 71.4 kg (157 lb 6.4 oz)   SpO2 94%   BMI 31.79 kg/m²     Physical Exam  Constitutional:       General: She is not in acute distress.  Cardiovascular:      Rate and Rhythm: Normal rate and regular rhythm.      Heart sounds: Normal heart sounds. Murmur heard.   Pulmonary:      Effort: Pulmonary effort is normal.      Breath sounds: Normal breath sounds. No wheezing.   Musculoskeletal:      Right lower leg: Edema (trace) present.      Left lower leg: Edema (trace) present.   Neurological:      Mental Status: She is alert and oriented to person, place, and time.      Gait: Gait normal.   Psychiatric:         Mood and Affect: Mood normal.         Behavior: Behavior normal.                         Assessment and Plan   Diagnoses and all orders for this visit:    1. Leg swelling (Primary)  2. Mild mitral regurgitation  -     Adult Transthoracic Echo Complete W/ Cont if Necessary Per Protocol; Future  Swelling improves overnight and increases as the day goes on as legs are down. Low suspicion for DVT given bilateral findings with improvements. Consider venous insufficiency, but given her known mitral regurgitation, we will first order Echo for evaluation.     3. Other fatigue  -     CBC (No Diff); Future  -     TSH; Future  Labs for surveillance and consider other pathologies if not revealing.     4. Mixed hyperlipidemia  -     Lipid Panel; Future  Stable on moderate intensity statin therapy per ACC/AHA guidelines.    5. Primary hypertension  -     Comprehensive Metabolic Panel; Future  Well controlled, BP goal for age is <140/90 per JNC 8 guidelines, and continue current medications    6. Vitamin D deficiency  -     Vitamin D,25-Hydroxy; Future    7. Annual visit for general adult medical examination with abnormal findings    8. Impaired glucose tolerance  -     Comprehensive Metabolic Panel; Future  -     Hemoglobin A1c; Future    9. Cigarette smoker  Patient was counseled on and understood the many " dangers of continuing to use tobacco. Despite this, Ms. Mann states quitting is not an immediate priority at this time. I reminded the patient that if quitting becomes an increased priority to contact us for help with quitting including pharmacologic & nonpharmacologic options or any additional resources.    10. Class 1 obesity due to excess calories with serious comorbidity and body mass index (BMI) of 31.0 to 31.9 in adult           Follow Up   Return in about 6 months (around 12/6/2024) for Recheck; cancel 6/11.  Patient was given instructions and counseling regarding her condition or for health maintenance advice. Please see specific information pulled into the AVS if appropriate.

## 2024-06-08 RX ORDER — PRAVASTATIN SODIUM 10 MG
20 TABLET ORAL DAILY
Qty: 90 TABLET | Refills: 3 | Status: SHIPPED | OUTPATIENT
Start: 2024-06-08

## 2024-07-16 ENCOUNTER — HOSPITAL ENCOUNTER (OUTPATIENT)
Dept: CARDIOLOGY | Facility: HOSPITAL | Age: 74
Discharge: HOME | End: 2024-07-16
Admitting: INTERNAL MEDICINE
Payer: MEDICARE

## 2024-07-16 DIAGNOSIS — I34.0 MILD MITRAL REGURGITATION: ICD-10-CM

## 2024-07-16 LAB
BH CV ECHO MEAS - AO MAX PG: 5.9 MMHG
BH CV ECHO MEAS - AO MEAN PG: 3.2 MMHG
BH CV ECHO MEAS - AO ROOT DIAM: 2.8 CM
BH CV ECHO MEAS - AO V2 MAX: 120.7 CM/SEC
BH CV ECHO MEAS - AO V2 VTI: 26.7 CM
BH CV ECHO MEAS - AVA(I,D): 4.8 CM2
BH CV ECHO MEAS - EDV(CUBED): 49.2 ML
BH CV ECHO MEAS - EDV(MOD-SP2): 14.3 ML
BH CV ECHO MEAS - EDV(MOD-SP4): 70.7 ML
BH CV ECHO MEAS - EF(MOD-SP2): 45.8 %
BH CV ECHO MEAS - EF(MOD-SP4): 76.8 %
BH CV ECHO MEAS - ESV(CUBED): 9.8 ML
BH CV ECHO MEAS - ESV(MOD-SP2): 7.7 ML
BH CV ECHO MEAS - ESV(MOD-SP4): 16.4 ML
BH CV ECHO MEAS - FS: 41.6 %
BH CV ECHO MEAS - IVS/LVPW: 1.02 CM
BH CV ECHO MEAS - IVSD: 0.98 CM
BH CV ECHO MEAS - LA DIMENSION: 3.3 CM
BH CV ECHO MEAS - LAT PEAK E' VEL: 7 CM/SEC
BH CV ECHO MEAS - LV DIASTOLIC VOL/BSA (35-75): 42.5 CM2
BH CV ECHO MEAS - LV MASS(C)D: 105.6 GRAMS
BH CV ECHO MEAS - LV MAX PG: 7 MMHG
BH CV ECHO MEAS - LV MEAN PG: 3.5 MMHG
BH CV ECHO MEAS - LV SYSTOLIC VOL/BSA (12-30): 9.9 CM2
BH CV ECHO MEAS - LV V1 MAX: 132.1 CM/SEC
BH CV ECHO MEAS - LV V1 VTI: 32 CM
BH CV ECHO MEAS - LVIDD: 3.7 CM
BH CV ECHO MEAS - LVIDS: 2.14 CM
BH CV ECHO MEAS - LVOT AREA: 4 CM2
BH CV ECHO MEAS - LVOT DIAM: 2.27 CM
BH CV ECHO MEAS - LVPWD: 0.96 CM
BH CV ECHO MEAS - MED PEAK E' VEL: 6 CM/SEC
BH CV ECHO MEAS - MV A MAX VEL: 104.7 CM/SEC
BH CV ECHO MEAS - MV DEC SLOPE: 200.2 CM/SEC2
BH CV ECHO MEAS - MV DEC TIME: 0.32 SEC
BH CV ECHO MEAS - MV E MAX VEL: 63.3 CM/SEC
BH CV ECHO MEAS - MV E/A: 0.6
BH CV ECHO MEAS - MV MAX PG: 4.1 MMHG
BH CV ECHO MEAS - MV MEAN PG: 1.48 MMHG
BH CV ECHO MEAS - MV V2 VTI: 25.5 CM
BH CV ECHO MEAS - MVA(VTI): 5.1 CM2
BH CV ECHO MEAS - PA V2 MAX: 102.7 CM/SEC
BH CV ECHO MEAS - RAP SYSTOLE: 8 MMHG
BH CV ECHO MEAS - RV MAX PG: 1.58 MMHG
BH CV ECHO MEAS - RV V1 MAX: 62.5 CM/SEC
BH CV ECHO MEAS - RV V1 VTI: 14.6 CM
BH CV ECHO MEAS - SV(LVOT): 129.1 ML
BH CV ECHO MEAS - SV(MOD-SP2): 6.6 ML
BH CV ECHO MEAS - SV(MOD-SP4): 54.3 ML
BH CV ECHO MEAS - SVI(LVOT): 77.6 ML/M2
BH CV ECHO MEAS - SVI(MOD-SP2): 3.9 ML/M2
BH CV ECHO MEAS - SVI(MOD-SP4): 32.6 ML/M2
BH CV ECHO MEASUREMENTS AVERAGE E/E' RATIO: 9.74
BH CV XLRA - TDI S': 15 CM/SEC
LEFT ATRIUM VOLUME INDEX: 30 ML/M2
LEFT ATRIUM VOLUME: 50 ML

## 2024-07-16 PROCEDURE — 93306 TTE W/DOPPLER COMPLETE: CPT

## 2024-07-16 PROCEDURE — 93356 MYOCRD STRAIN IMG SPCKL TRCK: CPT

## 2024-07-17 PROBLEM — I31.39 PERICARDIAL EFFUSION: Status: ACTIVE | Noted: 2024-07-17

## 2024-09-19 DIAGNOSIS — E78.2 MIXED HYPERLIPIDEMIA: ICD-10-CM

## 2024-09-19 RX ORDER — PRAVASTATIN SODIUM 10 MG
20 TABLET ORAL DAILY
Qty: 180 TABLET | Refills: 3 | Status: SHIPPED | OUTPATIENT
Start: 2024-09-19

## 2024-12-05 NOTE — PROGRESS NOTES
"Chief Complaint  Sleeping Problem, Hypotension, and bilateral arm pain     Subjective        Hillary Mann is a 74 y.o. female who presents today for evaluation of the above problems.    History of Present Illness  The patient presents for evaluation of multiple medical concerns    She reports difficulty in falling asleep due to persistent thoughts, often about events from 20 years ago. Her sleep duration is limited to 4 to 5 hours per night. She has purchased Unisom but has not yet taken it. Says melatonin makes her feel \"hung over\" so she does not take it. Has not been prescribed prescription sleep meds.  She does not experience awakenings due to shortness of breath or other signs of sleep apnea . Her anxiety is primarily at night, leading to daytime fatigue due to insufficient sleep. Denies anxiety during the day. Says even when she does fall asleep she also wakes ups with racing thoughts or dreams about the problems that were troubling her.     She experiences occasional episodes of hypotension, with readings dropping to around 113/60. A month ago, she had a significant drop in blood pressure at work, which led to her being taken to [facility]. The attending physician suggested that her amlodipine medication might be causing the hypotension and recommended reducing the dosage. She now takes amlodipine only on Mondays, Wednesdays, and Fridays, which seems to have stabilized her blood pressure. Says on the days she does not take amlodipine blood pressure is consistent with what it is running today.     She is due for a low dose CT scan in March 2025. She reports no chest pain, breathing difficulties, or swelling. She receives a 90-day supply of her medications. Is due for DEXA scan.   Is due for influenza vaccine which she is getting today.     She experiences constant pain in her arms, which extends to her elbows. She underwent physical therapy last year, which provided some relief, but the " "pain has since returned. She believes the pain may be due to her mattress. The pain subsides when she sits on the couch.She doesn't feel any further intervention is needed at this time, she just wanted to make us aware.     SOCIAL HISTORY  She still smokes.        Review of Systems - Negative except as noted per HPI  History obtained from the patient    Objective   Vital Signs:  /85 (BP Location: Left arm, Patient Position: Sitting, Cuff Size: Other (Comment))   Pulse 78   Temp 97.3 °F (36.3 °C) (Temporal)   Resp 16   Ht 149.9 cm (59\")   Wt 71.2 kg (157 lb)   SpO2 98%   BMI 31.71 kg/m²   Estimated body mass index is 31.71 kg/m² as calculated from the following:    Height as of this encounter: 149.9 cm (59\").    Weight as of this encounter: 71.2 kg (157 lb).           Physical Exam  Vitals reviewed.   Constitutional:       General: She is not in acute distress.     Appearance: Normal appearance. She is not ill-appearing.   Neck:      Thyroid: No thyroid mass, thyromegaly or thyroid tenderness.      Trachea: Trachea and phonation normal.   Cardiovascular:      Rate and Rhythm: Normal rate and regular rhythm.      Pulses: Normal pulses.      Heart sounds: Normal heart sounds. No murmur heard.     No friction rub. No gallop.   Pulmonary:      Effort: Pulmonary effort is normal. No respiratory distress.      Breath sounds: Normal breath sounds. No wheezing.   Musculoskeletal:      Cervical back: Normal range of motion and neck supple.   Lymphadenopathy:      Cervical: No cervical adenopathy.   Skin:     General: Skin is warm and dry.      Capillary Refill: Capillary refill takes less than 2 seconds.   Neurological:      General: No focal deficit present.      Mental Status: She is alert and oriented to person, place, and time.   Psychiatric:         Mood and Affect: Mood normal.         Behavior: Behavior normal.         Thought Content: Thought content normal.         Judgment: Judgment normal.      "     Result Review :                 Assessment and Plan   Diagnoses and all orders for this visit:    1. Primary insomnia (Primary)  She reports difficulty falling asleep due to a racing mind, averaging 4 to 5 hours of sleep per night. She has not yet tried Unisom, which was recommended as a first-line treatment before considering prescription medication. She was advised to avoid caffeine and sugar six hours prior to bedtime, reduce screen time, and dim lights an hour before sleep to promote melatonin production. If Unisom proves ineffective, a prescription medication will be considered. She will update on her progress early next week.  2. Primary hypertension  Assessment & Plan:  Hypertension is stable and controlled  Continue current treatment regimen.  Blood pressure will be reassessed in 6 months  Her current method seems to be working but discussed if this does not work she does need to take amlodipine every day 5 mg.  She verbalizes understanding.  Blood pressure goal is less than 150/90 per JNC 8 guidelines which she meets today.  Continue readings at home.      3. Screening for osteoporosis  -     DEXA Bone Density Axial; Future    4. Need for vaccination  -     Fluzone High-Dose 65+yrs ([id number])    5. Cigarette smoker  Assessment & Plan:  Patient was counseled on and understood the many dangers of continuing to use tobacco. Despite this, patient states quitting is not an immediate priority at this time. I reminded the patient that if quitting becomes an increased priority to contact us for help with quitting including pharmacologic & nonpharmacologic options or any additional resources.       6. Personal history of nicotine dependence  -     CT Chest Low Dose Wo; Future    7. Bilateral arm pain.  The pain, which runs from her neck to her elbows, may be due to a pinched nerve. She declined a nerve conduction test at this time, suspecting her mattress might be the cause. If the pain worsens or affects her  hand strength, a nerve conduction test will be ordered.      I will be providing care over continum for this patient including management of both chronic and acute medical conditions        I spent 30 minutes caring for Hillary on this date of service. This time includes time spent by me in the following activities:preparing for the visit, reviewing tests, obtaining and/or reviewing a separately obtained history, performing a medically appropriate examination and/or evaluation , counseling and educating the patient/family/caregiver, ordering medications, tests, or procedures, documenting information in the medical record, and independently interpreting results and communicating that information with the patient/family/caregiver  Follow Up   Return in about 6 months (around 6/6/2025) for Medicare Wellness.  Patient was given instructions and counseling regarding her condition or for health maintenance advice. Please see specific information pulled into the AVS if appropriate.       Patient or patient representative verbalized consent for the use of Ambient Listening during the visit with  ANA Montes for chart documentation. 12/6/2024  12:46 CST

## 2024-12-06 ENCOUNTER — OFFICE VISIT (OUTPATIENT)
Dept: INTERNAL MEDICINE | Facility: CLINIC | Age: 74
End: 2024-12-06
Payer: MEDICARE

## 2024-12-06 VITALS
BODY MASS INDEX: 31.65 KG/M2 | RESPIRATION RATE: 16 BRPM | TEMPERATURE: 97.3 F | WEIGHT: 157 LBS | SYSTOLIC BLOOD PRESSURE: 138 MMHG | DIASTOLIC BLOOD PRESSURE: 85 MMHG | OXYGEN SATURATION: 98 % | HEART RATE: 78 BPM | HEIGHT: 59 IN

## 2024-12-06 DIAGNOSIS — Z78.0 POST-MENOPAUSAL: ICD-10-CM

## 2024-12-06 DIAGNOSIS — F51.01 PRIMARY INSOMNIA: Primary | ICD-10-CM

## 2024-12-06 DIAGNOSIS — Z87.891 PERSONAL HISTORY OF NICOTINE DEPENDENCE: ICD-10-CM

## 2024-12-06 DIAGNOSIS — I10 ESSENTIAL HYPERTENSION: ICD-10-CM

## 2024-12-06 DIAGNOSIS — Z13.820 SCREENING FOR OSTEOPOROSIS: ICD-10-CM

## 2024-12-06 DIAGNOSIS — Z23 NEED FOR VACCINATION: ICD-10-CM

## 2024-12-06 DIAGNOSIS — F17.210 CIGARETTE SMOKER: ICD-10-CM

## 2024-12-06 DIAGNOSIS — I10 PRIMARY HYPERTENSION: ICD-10-CM

## 2024-12-06 DIAGNOSIS — F17.298 OTHER TOBACCO PRODUCT NICOTINE DEPENDENCE WITH OTHER NICOTINE-INDUCED DISORDER: ICD-10-CM

## 2024-12-06 RX ORDER — OLMESARTAN MEDOXOMIL 40 MG/1
40 TABLET ORAL DAILY
Qty: 90 TABLET | Refills: 3 | Status: SHIPPED | OUTPATIENT
Start: 2024-12-06

## 2024-12-06 RX ORDER — AMLODIPINE BESYLATE 10 MG/1
10 TABLET ORAL DAILY
Qty: 90 TABLET | Refills: 3 | Status: SHIPPED | OUTPATIENT
Start: 2024-12-06

## 2024-12-06 NOTE — ASSESSMENT & PLAN NOTE
Hypertension is stable and controlled  Continue current treatment regimen.  Blood pressure will be reassessed in 6 months  Her current method seems to be working but discussed if this does not work she does need to take amlodipine every day 5 mg.  She verbalizes understanding.  Blood pressure goal is less than 150/90 per JNC 8 guidelines which she meets today.  Continue readings at home.

## 2024-12-06 NOTE — ASSESSMENT & PLAN NOTE
Patient was counseled on and understood the many dangers of continuing to use tobacco. Despite this, patient states quitting is not an immediate priority at this time. I reminded the patient that if quitting becomes an increased priority to contact us for help with quitting including pharmacologic & nonpharmacologic options or any additional resources.

## 2024-12-18 ENCOUNTER — HOSPITAL ENCOUNTER (OUTPATIENT)
Dept: BONE DENSITY | Facility: HOSPITAL | Age: 74
Discharge: HOME | End: 2024-12-18
Admitting: NURSE PRACTITIONER
Payer: MEDICARE

## 2024-12-18 DIAGNOSIS — Z13.820 SCREENING FOR OSTEOPOROSIS: ICD-10-CM

## 2024-12-18 DIAGNOSIS — Z78.0 POST-MENOPAUSAL: ICD-10-CM

## 2024-12-18 PROCEDURE — 77080 DXA BONE DENSITY AXIAL: CPT

## 2025-04-08 ENCOUNTER — HOSPITAL ENCOUNTER (OUTPATIENT)
Dept: CT IMAGING | Facility: HOSPITAL | Age: 75
Discharge: HOME | End: 2025-04-08
Admitting: NURSE PRACTITIONER
Payer: MEDICARE

## 2025-04-08 DIAGNOSIS — Z87.891 PERSONAL HISTORY OF NICOTINE DEPENDENCE: ICD-10-CM

## 2025-04-08 DIAGNOSIS — F17.298 OTHER TOBACCO PRODUCT NICOTINE DEPENDENCE WITH OTHER NICOTINE-INDUCED DISORDER: ICD-10-CM

## 2025-04-08 PROCEDURE — 71271 CT THORAX LUNG CANCER SCR C-: CPT

## 2025-06-10 ENCOUNTER — LAB (OUTPATIENT)
Dept: LAB | Facility: HOSPITAL | Age: 75
End: 2025-06-10
Payer: MEDICARE

## 2025-06-10 ENCOUNTER — OFFICE VISIT (OUTPATIENT)
Dept: INTERNAL MEDICINE | Facility: CLINIC | Age: 75
End: 2025-06-10
Payer: MEDICARE

## 2025-06-10 VITALS
SYSTOLIC BLOOD PRESSURE: 137 MMHG | WEIGHT: 153.4 LBS | BODY MASS INDEX: 30.92 KG/M2 | OXYGEN SATURATION: 98 % | DIASTOLIC BLOOD PRESSURE: 81 MMHG | HEIGHT: 59 IN | RESPIRATION RATE: 16 BRPM | HEART RATE: 80 BPM

## 2025-06-10 DIAGNOSIS — M25.511 CHRONIC PAIN OF BOTH SHOULDERS: Primary | ICD-10-CM

## 2025-06-10 DIAGNOSIS — M25.512 CHRONIC PAIN OF BOTH SHOULDERS: Primary | ICD-10-CM

## 2025-06-10 DIAGNOSIS — R73.02 IMPAIRED GLUCOSE TOLERANCE: ICD-10-CM

## 2025-06-10 DIAGNOSIS — M25.512 CHRONIC PAIN OF BOTH SHOULDERS: ICD-10-CM

## 2025-06-10 DIAGNOSIS — M85.852 OSTEOPENIA OF LEFT HIP: ICD-10-CM

## 2025-06-10 DIAGNOSIS — E78.2 MIXED HYPERLIPIDEMIA: ICD-10-CM

## 2025-06-10 DIAGNOSIS — G89.29 CHRONIC PAIN OF BOTH SHOULDERS: ICD-10-CM

## 2025-06-10 DIAGNOSIS — I10 PRIMARY HYPERTENSION: ICD-10-CM

## 2025-06-10 DIAGNOSIS — G89.29 CHRONIC PAIN OF BOTH SHOULDERS: Primary | ICD-10-CM

## 2025-06-10 DIAGNOSIS — F17.210 CIGARETTE SMOKER: ICD-10-CM

## 2025-06-10 DIAGNOSIS — E66.09 CLASS 1 OBESITY DUE TO EXCESS CALORIES WITH SERIOUS COMORBIDITY AND BODY MASS INDEX (BMI) OF 31.0 TO 31.9 IN ADULT: ICD-10-CM

## 2025-06-10 DIAGNOSIS — M25.511 CHRONIC PAIN OF BOTH SHOULDERS: ICD-10-CM

## 2025-06-10 DIAGNOSIS — E55.9 VITAMIN D DEFICIENCY: ICD-10-CM

## 2025-06-10 DIAGNOSIS — E66.811 CLASS 1 OBESITY DUE TO EXCESS CALORIES WITH SERIOUS COMORBIDITY AND BODY MASS INDEX (BMI) OF 31.0 TO 31.9 IN ADULT: ICD-10-CM

## 2025-06-10 PROBLEM — Z86.73 HISTORY OF STROKE: Status: ACTIVE | Noted: 2025-06-10

## 2025-06-10 LAB
25(OH)D3 SERPL-MCNC: 67.6 NG/ML (ref 30–100)
ALBUMIN SERPL-MCNC: 4.1 G/DL (ref 3.5–5.2)
ALBUMIN/GLOB SERPL: 1.2 G/DL
ALP SERPL-CCNC: 85 U/L (ref 39–117)
ALT SERPL W P-5'-P-CCNC: 8 U/L (ref 1–33)
ANION GAP SERPL CALCULATED.3IONS-SCNC: 14 MMOL/L (ref 5–15)
AST SERPL-CCNC: 13 U/L (ref 1–32)
BILIRUB SERPL-MCNC: 0.3 MG/DL (ref 0–1.2)
BUN SERPL-MCNC: 10.3 MG/DL (ref 8–23)
BUN/CREAT SERPL: 12.9 (ref 7–25)
CALCIUM SPEC-SCNC: 9.3 MG/DL (ref 8.6–10.5)
CHLORIDE SERPL-SCNC: 105 MMOL/L (ref 98–107)
CHOLEST SERPL-MCNC: 173 MG/DL (ref 0–200)
CHROMATIN AB SERPL-ACNC: 23 IU/ML (ref 0–14)
CO2 SERPL-SCNC: 25 MMOL/L (ref 22–29)
CREAT SERPL-MCNC: 0.8 MG/DL (ref 0.57–1)
CRP SERPL-MCNC: 0.62 MG/DL (ref 0–0.5)
DEPRECATED RDW RBC AUTO: 45.1 FL (ref 37–54)
EGFRCR SERPLBLD CKD-EPI 2021: 78.4 ML/MIN/1.73
ERYTHROCYTE [DISTWIDTH] IN BLOOD BY AUTOMATED COUNT: 13.9 % (ref 12.3–15.4)
ERYTHROCYTE [SEDIMENTATION RATE] IN BLOOD: 24 MM/HR (ref 0–30)
GLOBULIN UR ELPH-MCNC: 3.4 GM/DL
GLUCOSE SERPL-MCNC: 108 MG/DL (ref 65–99)
HBA1C MFR BLD: 5.7 % (ref 4.8–5.6)
HCT VFR BLD AUTO: 43.1 % (ref 34–46.6)
HDLC SERPL-MCNC: 46 MG/DL (ref 40–60)
HGB BLD-MCNC: 14 G/DL (ref 12–15.9)
LDLC SERPL CALC-MCNC: 87 MG/DL (ref 0–100)
LDLC/HDLC SERPL: 1.72 {RATIO}
MAGNESIUM SERPL-MCNC: 2.2 MG/DL (ref 1.6–2.4)
MCH RBC QN AUTO: 29 PG (ref 26.6–33)
MCHC RBC AUTO-ENTMCNC: 32.5 G/DL (ref 31.5–35.7)
MCV RBC AUTO: 89.2 FL (ref 79–97)
PHOSPHATE SERPL-MCNC: 3.5 MG/DL (ref 2.5–4.5)
PLATELET # BLD AUTO: 363 10*3/MM3 (ref 140–450)
PMV BLD AUTO: 9.6 FL (ref 6–12)
POTASSIUM SERPL-SCNC: 3.8 MMOL/L (ref 3.5–5.2)
PROT SERPL-MCNC: 7.5 G/DL (ref 6–8.5)
RBC # BLD AUTO: 4.83 10*6/MM3 (ref 3.77–5.28)
SODIUM SERPL-SCNC: 144 MMOL/L (ref 136–145)
TRIGL SERPL-MCNC: 239 MG/DL (ref 0–150)
VLDLC SERPL-MCNC: 40 MG/DL (ref 5–40)
WBC NRBC COR # BLD AUTO: 9.29 10*3/MM3 (ref 3.4–10.8)

## 2025-06-10 PROCEDURE — 3075F SYST BP GE 130 - 139MM HG: CPT | Performed by: INTERNAL MEDICINE

## 2025-06-10 PROCEDURE — G0439 PPPS, SUBSEQ VISIT: HCPCS | Performed by: INTERNAL MEDICINE

## 2025-06-10 PROCEDURE — 84100 ASSAY OF PHOSPHORUS: CPT

## 2025-06-10 PROCEDURE — G2211 COMPLEX E/M VISIT ADD ON: HCPCS | Performed by: INTERNAL MEDICINE

## 2025-06-10 PROCEDURE — 80053 COMPREHEN METABOLIC PANEL: CPT

## 2025-06-10 PROCEDURE — 86140 C-REACTIVE PROTEIN: CPT

## 2025-06-10 PROCEDURE — 1170F FXNL STATUS ASSESSED: CPT | Performed by: INTERNAL MEDICINE

## 2025-06-10 PROCEDURE — 36415 COLL VENOUS BLD VENIPUNCTURE: CPT

## 2025-06-10 PROCEDURE — 83735 ASSAY OF MAGNESIUM: CPT

## 2025-06-10 PROCEDURE — 80061 LIPID PANEL: CPT

## 2025-06-10 PROCEDURE — 3079F DIAST BP 80-89 MM HG: CPT | Performed by: INTERNAL MEDICINE

## 2025-06-10 PROCEDURE — 86431 RHEUMATOID FACTOR QUANT: CPT

## 2025-06-10 PROCEDURE — 1160F RVW MEDS BY RX/DR IN RCRD: CPT | Performed by: INTERNAL MEDICINE

## 2025-06-10 PROCEDURE — 85652 RBC SED RATE AUTOMATED: CPT

## 2025-06-10 PROCEDURE — 1159F MED LIST DOCD IN RCRD: CPT | Performed by: INTERNAL MEDICINE

## 2025-06-10 PROCEDURE — 83036 HEMOGLOBIN GLYCOSYLATED A1C: CPT

## 2025-06-10 PROCEDURE — 99214 OFFICE O/P EST MOD 30 MIN: CPT | Performed by: INTERNAL MEDICINE

## 2025-06-10 PROCEDURE — 82306 VITAMIN D 25 HYDROXY: CPT

## 2025-06-10 PROCEDURE — 85027 COMPLETE CBC AUTOMATED: CPT

## 2025-06-10 RX ORDER — AMLODIPINE BESYLATE 5 MG/1
5 TABLET ORAL DAILY
Qty: 90 TABLET | Refills: 1 | Status: SHIPPED | OUTPATIENT
Start: 2025-06-10

## 2025-06-10 NOTE — PROGRESS NOTES
Subjective   The ABCs of the Annual Wellness Visit  Medicare Wellness Visit      Hillary Mann is a 74 y.o. patient who presents for a Medicare Wellness Visit.    The following portions of the patient's history were reviewed and   updated as appropriate: allergies, current medications, past family history, past medical history, past social history, past surgical history, and problem list.    Compared to one year ago, the patient's physical   health is the same.  Compared to one year ago, the patient's mental   health is the same.    Recent Hospitalizations:  She was not admitted to the hospital during the last year.     Current Medical Providers:  Patient Care Team:  Christopher Jo DO as PCP - General (Internal Medicine)  LORNE Martin MD as Consulting Physician (Orthopedic Surgery)    Outpatient Medications Prior to Visit   Medication Sig Dispense Refill    aspirin 81 MG chewable tablet Chew 1 tablet Daily.      Diclofenac Sodium (VOLTAREN) 1 % gel gel Apply 4 g topically to the appropriate area as directed 4 (Four) Times a Day As Needed (knee pain). 350 g 1    olmesartan (BENICAR) 40 MG tablet Take 1 tablet by mouth Daily. 90 tablet 3    pravastatin (PRAVACHOL) 10 MG tablet Take 2 tablets by mouth Daily. 180 tablet 3    amLODIPine (NORVASC) 10 MG tablet Take 1 tablet by mouth Daily. 90 tablet 3    nicotine (NICODERM CQ) 14 MG/24HR patch Place 1 patch on the skin as directed by provider Daily. (Patient not taking: Reported on 12/7/2023) 21 each 0    nicotine (NICODERM CQ) 21 MG/24HR patch Place 1 patch on the skin as directed by provider Daily. (Patient not taking: Reported on 6/6/2024) 28 each 0    nicotine (NICODERM CQ) 7 MG/24HR patch Place 1 patch on the skin as directed by provider Daily. (Patient not taking: Reported on 12/7/2023) 14 each 1     No facility-administered medications prior to visit.     No opioid medication identified on active medication list. I have reviewed chart for other  "potential  high risk medication/s and harmful drug interactions in the elderly.      Aspirin is on active medication list. Aspirin use is indicated based on review of current medical condition/s. Pros and cons of this therapy have been discussed today. Benefits of this medication outweigh potential harm.  Patient has been encouraged to continue taking this medication.  .      Patient Active Problem List   Diagnosis    Primary hypertension    Class 1 obesity due to excess calories with serious comorbidity and body mass index (BMI) of 31.0 to 31.9 in adult    Vitamin D deficiency    Cigarette smoker    Mild mitral regurgitation    Osteopenia of left hip    Urge incontinence of urine    Mixed hyperlipidemia    Pericardial effusion    Primary insomnia    Impaired glucose tolerance    History of stroke     Advance Care Planning Advance Directive is not on file.  ACP discussion was held with the patient during this visit. Patient has an advance directive (not in EMR), copy requested.            Objective   Vitals:    06/10/25 0910   BP: 137/81   BP Location: Left arm   Patient Position: Sitting   Cuff Size: Adult   Pulse: 80   Resp: 16   SpO2: 98%   Weight: 69.6 kg (153 lb 6.4 oz)   Height: 149.9 cm (59\")       Estimated body mass index is 30.98 kg/m² as calculated from the following:    Height as of this encounter: 149.9 cm (59\").    Weight as of this encounter: 69.6 kg (153 lb 6.4 oz).    BMI is >= 30 and <35. (Class 1 Obesity). The following options were offered after discussion;: exercise counseling/recommendations and nutrition counseling/recommendations           Does the patient have evidence of cognitive impairment? No                                                                                                Health  Risk Assessment    Smoking Status:  Social History     Tobacco Use   Smoking Status Every Day    Current packs/day: 0.50    Average packs/day: 1.7 packs/day for 55.4 years (95.7 ttl pk-yrs)    " Types: Cigarettes    Start date:     Passive exposure: Current   Smokeless Tobacco Never   Tobacco Comments    down to 1/2 pack a day 6/10/25     Alcohol Consumption:  Social History     Substance and Sexual Activity   Alcohol Use No       Fall Risk Screen  ANGEL Fall Risk Assessment was completed, and patient is at LOW risk for falls.Assessment completed on:6/10/2025    Depression Screening   Little interest or pleasure in doing things? Not at all   Feeling down, depressed, or hopeless? Not at all   PHQ-2 Total Score 0      Health Habits and Functional and Cognitive Screenin/10/2025     9:00 AM   Functional & Cognitive Status   Do you have difficulty preparing food and eating? No   Do you have difficulty bathing yourself, getting dressed or grooming yourself? Yes   Do you have difficulty using the toilet? No   Do you have difficulty moving around from place to place? No   Do you have trouble with steps or getting out of a bed or a chair? No   Current Diet Unhealthy Diet   Dental Exam Up to date   Eye Exam Up to date   Exercise (times per week) 0 times per week   Current Exercises Include No Regular Exercise   Do you need help using the phone?  No   Are you deaf or do you have serious difficulty hearing?  No   Do you need help to go to places out of walking distance? No   Do you need help shopping? No   Do you need help preparing meals?  No   Do you need help with housework?  No   Do you need help with laundry? No   Do you need help taking your medications? No   Do you need help managing money? No   Do you ever drive or ride in a car without wearing a seat belt? No   Have you felt unusual stress, anger or loneliness in the last month? No   Who do you live with? Spouse   If you need help, do you have trouble finding someone available to you? No   Have you been bothered in the last four weeks by sexual problems? No   Do you have difficulty concentrating, remembering or making decisions? No            Age-appropriate Screening Schedule:  Refer to the list below for future screening recommendations based on patient's age, sex and/or medical conditions. Orders for these recommended tests are listed in the plan section. The patient has been provided with a written plan.    Health Maintenance List  Health Maintenance   Topic Date Due    TDAP/TD VACCINES (1 - Tdap) Never done    ZOSTER VACCINE (1 of 2) Never done    COVID-19 Vaccine (3 - 2024-25 season) 09/01/2024    ANNUAL WELLNESS VISIT  06/06/2025    LIPID PANEL  06/06/2025    COLORECTAL CANCER SCREENING  06/23/2025    INFLUENZA VACCINE  07/01/2025    MAMMOGRAM  03/26/2026    LUNG CANCER SCREENING  04/08/2026    DXA SCAN  12/18/2026    HEPATITIS C SCREENING  Completed    Pneumococcal Vaccine 50+  Completed                                                                                                                                                CMS Preventative Services Quick Reference  Risk Factors Identified During Encounter  Fall Risk-High or Moderate: Discussed Fall Prevention in the home  Immunizations Discussed/Encouraged: Tdap and Shingrix  Dental Screening Recommended  Vision Screening Recommended    The above risks/problems have been discussed with the patient.  Pertinent information has been shared with the patient in the After Visit Summary.  An After Visit Summary and PPPS were made available to the patient.    Follow Up:   Next Medicare Wellness visit to be scheduled in 1 year.         Additional E&M Note during same encounter follows:  Patient has additional, significant, and separately identifiable condition(s)/problem(s) that require work above and beyond the Medicare Wellness Visit     Chief Complaint  Shoulder Pain    Subjective    HPI  Hillary is also being seen today for additional medical problem/s.       The patient came in for evaluation of shoulder pain, high blood pressure, and foot cramps.    She reports pain in both shoulders that  "makes it hard to raise her arms above her head and keeps her from sleeping well. She has adjusted her daily activities to manage the pain and is considering buying a door hook for stretching exercises, which helped during physical therapy. The Voltaren gel did not help. Physical therapy last year provided relief for about a month, but she did not continue the exercises at home afterward. She also experiences occasional hip pain and neck stiffness, especially in the mornings, with popping sounds when she moves her neck. In December 2024, she was told she might have a pinched nerve in her neck.    In September 2024, she had a significant drop in blood pressure and was taken to the hospital by ambulance. It was suspected that her medication, amlodipine, was causing the low blood pressure, so she reduced the dosage to three times a week. She has not had any further episodes since then. She currently takes amlodipine on Mondays, Wednesdays, and Fridays, and a daily aspirin following an incident two to three years ago when she felt tired and had slurred speech. An ER visit with x-rays and CT scans did not find anything, but she was advised to start taking baby aspirin and has continued since.    She reports severe foot cramps and tight calf muscles when she wakes up, causing her to wobble for 5-10 minutes until it subsides.  Review of Systems   Respiratory:  Negative for shortness of breath and wheezing.    Cardiovascular:  Negative for chest pain and palpitations.   Musculoskeletal:  Positive for arthralgias.          Objective   Vital Signs:  /81 (BP Location: Left arm, Patient Position: Sitting, Cuff Size: Adult)   Pulse 80   Resp 16   Ht 149.9 cm (59\")   Wt 69.6 kg (153 lb 6.4 oz)   SpO2 98%   BMI 30.98 kg/m²   Physical Exam  Constitutional:       General: She is not in acute distress.  Pulmonary:      Effort: Pulmonary effort is normal. No respiratory distress.   Musculoskeletal:      Comments: Jaime and " Anthony test negative. Empty can test negative. TTP over the AC joint bilaterally.   Neurological:      Mental Status: She is alert and oriented to person, place, and time.                    Assessment and Plan   Diagnoses and all orders for this visit:    1. Chronic pain of both shoulders (Primary)  -     Sedimentation Rate; Future  -     C-reactive Protein; Future  -     Rheumatoid Factor; Future  Differential includes OA, PMR among other inflammatory issues. Labs for evaluation, but consider referral to sports medicine if shoulder pain persists.     2. Primary hypertension  -     Comprehensive Metabolic Panel; Future  -     CBC (No Diff); Future  -     amLODIPine (NORVASC) 5 MG tablet; Take 1 tablet by mouth Daily.  Dispense: 90 tablet; Refill: 1  Well controlled, BP goal for age is <140/90 per JNC 8 guidelines, and change amlodipine to 5mg daily instead of 10mg thrice weekly.    3. Vitamin D deficiency  -     Vitamin D 25 hydroxy; Future    4. Mixed hyperlipidemia  -     Lipid Panel; Future  Stable on moderate intensity statin therapy per ACC/AHA guidelines.    5. Osteopenia of left hip  -     Vitamin D 25 hydroxy; Future  -     Phosphorus; Future  -     Magnesium; Future  Labs for evaluation.     6. Impaired glucose tolerance  -     Hemoglobin A1c; Future    7. Cigarette smoker  Patient was counseled on and understood the many dangers of continuing to use tobacco. Despite this, Ms. Mann states quitting is not an immediate priority at this time. I reminded the patient that if quitting becomes an increased priority to contact us for help with quitting including pharmacologic & nonpharmacologic options or any additional resources.    8. Class 1 obesity due to excess calories with serious comorbidity and body mass index (BMI) of 31.0 to 31.9 in adult  BMI is >= 30 and <35. (Class 1 Obesity). The following options were offered after discussion;: exercise counseling/recommendations and nutrition  counseling/recommendations      Follow Up   Return in about 6 months (around 12/10/2025) for Recheck.  Patient was given instructions and counseling regarding her condition or for health maintenance advice. Please see specific information pulled into the AVS if appropriate.  Patient or patient representative verbalized consent for the use of Ambient Listening during the visit with  Christopher Jo DO for chart documentation. 6/10/2025  10:02 CDT

## 2025-06-10 NOTE — PATIENT INSTRUCTIONS
You should call [phone] to schedule your colonoscopy.      Medicare Wellness  Personal Prevention Plan of Service     Date of Office Visit:    Encounter Provider:  Christopher Jo DO  Place of Service:  [facility]  Patient Name: Hillary Mann  :  1950    As part of the Medicare Wellness portion of your visit today, we are providing you with this personalized preventive plan of services (PPPS). This plan is based upon recommendations of the [place] Preventive Services Task Force (USPSTF) and the Advisory Committee on Immunization Practices (ACIP).    This lists the preventive care services that should be considered, and provides dates of when you are due. Items listed as completed are up-to-date and do not require any further intervention.    Health Maintenance   Topic Date Due    TDAP/TD VACCINES (1 - Tdap) Never done    ZOSTER VACCINE (1 of 2) Never done    COVID-19 Vaccine (3 -  season) 2024    ANNUAL WELLNESS VISIT  2025    LIPID PANEL  2025    COLORECTAL CANCER SCREENING  2025    INFLUENZA VACCINE  2025    MAMMOGRAM  2026    LUNG CANCER SCREENING  2026    DXA SCAN  2026    HEPATITIS C SCREENING  Completed    Pneumococcal Vaccine 50+  Completed       Orders Placed This Encounter   Procedures    Comprehensive Metabolic Panel     Standing Status:   Future     Expected Date:   6/10/2025     Expiration Date:   6/10/2026     Release to patient:   Routine Release [[id number]]    Hemoglobin A1c     Standing Status:   Future     Expected Date:   6/10/2025     Expiration Date:   6/10/2026     Release to patient:   Routine Release [[id number]]    Lipid Panel     Standing Status:   Future     Expected Date:   6/10/2025     Expiration Date:   6/10/2026     Release to patient:   Routine Release [[id number]]    CBC (No Diff)     Standing Status:   Future     Expected Date:   6/10/2025     Expiration Date:    6/10/2026     Release to patient:   Routine Release [[id number]]    Sedimentation Rate     Standing Status:   Future     Expected Date:   6/10/2025     Expiration Date:   6/10/2026     Release to patient:   Routine Release [[id number]]    C-reactive Protein     Standing Status:   Future     Expected Date:   6/10/2025     Expiration Date:   6/10/2026     Release to patient:   Routine Release [[id number]]    Vitamin D 25 hydroxy     Standing Status:   Future     Expected Date:   6/15/2025     Expiration Date:   9/10/2026     Release to patient:   Routine Release [[id number]]    Phosphorus     Standing Status:   Future     Expected Date:   6/15/2025     Expiration Date:   9/10/2026     Release to patient:   Routine Release [[id number]]    Magnesium     Standing Status:   Future     Expected Date:   6/15/2025     Expiration Date:   9/10/2026     Release to patient:   Routine Release [[id number]]    Rheumatoid Factor     Standing Status:   Future     Expected Date:   6/10/2025     Expiration Date:   6/10/2026     Release to patient:   Routine Release [[id number]]       Return in about 6 months (around 12/10/2025) for Recheck.

## 2025-06-16 ENCOUNTER — LAB (OUTPATIENT)
Dept: LAB | Facility: HOSPITAL | Age: 75
End: 2025-06-16
Payer: MEDICARE

## 2025-06-16 DIAGNOSIS — M05.80 POLYARTHRITIS WITH POSITIVE RHEUMATOID FACTOR: ICD-10-CM

## 2025-06-16 PROCEDURE — 86200 CCP ANTIBODY: CPT

## 2025-06-16 PROCEDURE — 36415 COLL VENOUS BLD VENIPUNCTURE: CPT

## 2025-06-17 LAB — CCP IGA+IGG SERPL IA-ACNC: >250 UNITS (ref 0–19)

## 2025-08-18 ENCOUNTER — TRANSCRIBE ORDERS (OUTPATIENT)
Dept: ADMINISTRATIVE | Facility: HOSPITAL | Age: 75
End: 2025-08-18
Payer: MEDICARE

## 2025-08-18 ENCOUNTER — HOSPITAL ENCOUNTER (OUTPATIENT)
Dept: GENERAL RADIOLOGY | Facility: HOSPITAL | Age: 75
Discharge: HOME | End: 2025-08-18
Admitting: INTERNAL MEDICINE
Payer: MEDICARE

## 2025-08-18 DIAGNOSIS — R76.8 CYCLIC CITRULLINATED PEPTIDE (CCP) ANTIBODY POSITIVE: Primary | ICD-10-CM

## 2025-08-18 PROCEDURE — 73030 X-RAY EXAM OF SHOULDER: CPT

## (undated) DEVICE — MASK,OXYGEN,MED CONC,ADLT,7' TUB, UC: Brand: PENDING

## (undated) DEVICE — THE SINGLE USE ETRAP – POLYP TRAP IS USED FOR SUCTION RETRIEVAL OF ENDOSCOPICALLY REMOVED POLYPS.: Brand: ETRAP

## (undated) DEVICE — SENSR O2 OXIMAX FNGR A/ 18IN NONSTR

## (undated) DEVICE — SNAR POLYP CAPTIVATOR MICROHEX 13 240CM

## (undated) DEVICE — THE CHANNEL CLEANING BRUSH IS A NYLON FLEXI BRUSH ATTACHED TO A FLEXIBLE PLASTIC SHEATH DESIGNED TO SAFELY REMOVE DEBRIS FROM FLEXIBLE ENDOSCOPES.

## (undated) DEVICE — TBG SMPL FLTR LINE NASL 02/C02 A/ BX/100

## (undated) DEVICE — Device: Brand: DEFENDO AIR/WATER/SUCTION AND BIOPSY VALVE

## (undated) DEVICE — YANKAUER,BULB TIP WITH VENT: Brand: ARGYLE